# Patient Record
Sex: FEMALE | Race: WHITE | HISPANIC OR LATINO | Employment: UNEMPLOYED | URBAN - METROPOLITAN AREA
[De-identification: names, ages, dates, MRNs, and addresses within clinical notes are randomized per-mention and may not be internally consistent; named-entity substitution may affect disease eponyms.]

---

## 2017-02-14 ENCOUNTER — NON-PROVIDER VISIT (OUTPATIENT)
Dept: OBGYN | Facility: CLINIC | Age: 22
End: 2017-02-14
Payer: COMMERCIAL

## 2017-02-14 DIAGNOSIS — Z32.01 PREGNANCY EXAMINATION OR TEST, POSITIVE RESULT: ICD-10-CM

## 2017-02-14 LAB
INT CON NEG: NEGATIVE
INT CON POS: POSITIVE
POC URINE PREGNANCY TEST: POSITIVE

## 2017-02-14 PROCEDURE — 81025 URINE PREGNANCY TEST: CPT | Performed by: OBSTETRICS & GYNECOLOGY

## 2017-07-22 ENCOUNTER — HOSPITAL ENCOUNTER (OUTPATIENT)
Dept: LAB | Facility: MEDICAL CENTER | Age: 22
End: 2017-07-22
Attending: SPECIALIST
Payer: MEDICAID

## 2017-07-22 LAB
ABO GROUP BLD: NORMAL
BASOPHILS # BLD AUTO: 0.4 % (ref 0–1.8)
BASOPHILS # BLD: 0.05 K/UL (ref 0–0.12)
BLD GP AB SCN SERPL QL: NORMAL
EOSINOPHIL # BLD AUTO: 0.15 K/UL (ref 0–0.51)
EOSINOPHIL NFR BLD: 1.1 % (ref 0–6.9)
ERYTHROCYTE [DISTWIDTH] IN BLOOD BY AUTOMATED COUNT: 45.1 FL (ref 35.9–50)
HBV SURFACE AG SER QL: NEGATIVE
HCT VFR BLD AUTO: 35.7 % (ref 37–47)
HGB BLD-MCNC: 12.1 G/DL (ref 12–16)
IMM GRANULOCYTES # BLD AUTO: 0.05 K/UL (ref 0–0.11)
IMM GRANULOCYTES NFR BLD AUTO: 0.4 % (ref 0–0.9)
LYMPHOCYTES # BLD AUTO: 3.86 K/UL (ref 1–4.8)
LYMPHOCYTES NFR BLD: 28 % (ref 22–41)
MCH RBC QN AUTO: 30.5 PG (ref 27–33)
MCHC RBC AUTO-ENTMCNC: 33.9 G/DL (ref 33.6–35)
MCV RBC AUTO: 89.9 FL (ref 81.4–97.8)
MONOCYTES # BLD AUTO: 0.71 K/UL (ref 0–0.85)
MONOCYTES NFR BLD AUTO: 5.2 % (ref 0–13.4)
NEUTROPHILS # BLD AUTO: 8.95 K/UL (ref 2–7.15)
NEUTROPHILS NFR BLD: 64.9 % (ref 44–72)
NRBC # BLD AUTO: 0 K/UL
NRBC BLD AUTO-RTO: 0 /100 WBC
PLATELET # BLD AUTO: 284 K/UL (ref 164–446)
PMV BLD AUTO: 10.6 FL (ref 9–12.9)
RBC # BLD AUTO: 3.97 M/UL (ref 4.2–5.4)
RH BLD: NORMAL
RUBV AB SER QL: 132.5 IU/ML
TREPONEMA PALLIDUM IGG+IGM AB [PRESENCE] IN SERUM OR PLASMA BY IMMUNOASSAY: NON REACTIVE
WBC # BLD AUTO: 13.8 K/UL (ref 4.8–10.8)

## 2017-07-22 PROCEDURE — 85025 COMPLETE CBC W/AUTO DIFF WBC: CPT

## 2017-07-22 PROCEDURE — 86900 BLOOD TYPING SEROLOGIC ABO: CPT

## 2017-07-22 PROCEDURE — 86850 RBC ANTIBODY SCREEN: CPT

## 2017-07-22 PROCEDURE — 81511 FTL CGEN ABNOR FOUR ANAL: CPT

## 2017-07-22 PROCEDURE — 87340 HEPATITIS B SURFACE AG IA: CPT

## 2017-07-22 PROCEDURE — 36415 COLL VENOUS BLD VENIPUNCTURE: CPT

## 2017-07-22 PROCEDURE — 86780 TREPONEMA PALLIDUM: CPT

## 2017-07-22 PROCEDURE — 86762 RUBELLA ANTIBODY: CPT

## 2017-07-22 PROCEDURE — 86901 BLOOD TYPING SEROLOGIC RH(D): CPT

## 2017-07-25 LAB
# FETUSES US: NORMAL
AFP MOM SERPL: 0.81
AFP SERPL-MCNC: 21 NG/ML
AGE - REPORTED: 24.7 YR
GA METHOD: NORMAL
GA: 16.57 WEEKS
HCG MOM SERPL: 0.52
HCG SERPL-ACNC: NORMAL IU/L
IDDM PATIENT QL: NO
INHIBIN A MOM SERPL: 1.13
INHIBIN A SERPL-MCNC: 151 PG/ML
INTEGRATED SCN PATIENT-IMP: NORMAL
PATHOLOGY STUDY: NORMAL
U ESTRIOL MOM SERPL: 0.85
U ESTRIOL SERPL-MCNC: 0.72 NG/ML

## 2017-10-19 ENCOUNTER — APPOINTMENT (OUTPATIENT)
Dept: RADIOLOGY | Facility: MEDICAL CENTER | Age: 22
DRG: 781 | End: 2017-10-19
Attending: UROLOGY
Payer: MEDICAID

## 2017-10-19 ENCOUNTER — APPOINTMENT (OUTPATIENT)
Dept: RADIOLOGY | Facility: MEDICAL CENTER | Age: 22
DRG: 781 | End: 2017-10-19
Attending: NURSE PRACTITIONER
Payer: MEDICAID

## 2017-10-19 ENCOUNTER — HOSPITAL ENCOUNTER (INPATIENT)
Facility: MEDICAL CENTER | Age: 22
LOS: 1 days | DRG: 781 | End: 2017-10-20
Attending: SPECIALIST | Admitting: SPECIALIST
Payer: MEDICAID

## 2017-10-19 ENCOUNTER — APPOINTMENT (OUTPATIENT)
Dept: RADIOLOGY | Facility: MEDICAL CENTER | Age: 22
DRG: 781 | End: 2017-10-19
Attending: SPECIALIST
Payer: MEDICAID

## 2017-10-19 LAB
ALBUMIN SERPL BCP-MCNC: 3.7 G/DL (ref 3.2–4.9)
ALBUMIN/GLOB SERPL: 1.2 G/DL
ALP SERPL-CCNC: 94 U/L (ref 30–99)
ALT SERPL-CCNC: 9 U/L (ref 2–50)
AMYLASE SERPL-CCNC: 26 U/L (ref 20–103)
ANION GAP SERPL CALC-SCNC: 13 MMOL/L (ref 0–11.9)
APPEARANCE UR: ABNORMAL
APPEARANCE UR: CLEAR
AST SERPL-CCNC: 11 U/L (ref 12–45)
BASOPHILS # BLD AUTO: 0.2 % (ref 0–1.8)
BASOPHILS # BLD: 0.04 K/UL (ref 0–0.12)
BILIRUB SERPL-MCNC: 0.4 MG/DL (ref 0.1–1.5)
BUN SERPL-MCNC: 11 MG/DL (ref 8–22)
CALCIUM SERPL-MCNC: 9.5 MG/DL (ref 8.5–10.5)
CHLORIDE SERPL-SCNC: 103 MMOL/L (ref 96–112)
CO2 SERPL-SCNC: 22 MMOL/L (ref 20–33)
COLOR UR AUTO: YELLOW
COLOR UR AUTO: YELLOW
CREAT SERPL-MCNC: 0.64 MG/DL (ref 0.5–1.4)
EOSINOPHIL # BLD AUTO: 0.07 K/UL (ref 0–0.51)
EOSINOPHIL NFR BLD: 0.4 % (ref 0–6.9)
ERYTHROCYTE [DISTWIDTH] IN BLOOD BY AUTOMATED COUNT: 45.1 FL (ref 35.9–50)
GFR SERPL CREATININE-BSD FRML MDRD: >60 ML/MIN/1.73 M 2
GLOBULIN SER CALC-MCNC: 3.1 G/DL (ref 1.9–3.5)
GLUCOSE SERPL-MCNC: 86 MG/DL (ref 65–99)
GLUCOSE UR QL STRIP.AUTO: NEGATIVE MG/DL
GLUCOSE UR QL STRIP.AUTO: NEGATIVE MG/DL
HCT VFR BLD AUTO: 34.7 % (ref 37–47)
HGB BLD-MCNC: 11.6 G/DL (ref 12–16)
IMM GRANULOCYTES # BLD AUTO: 0.14 K/UL (ref 0–0.11)
IMM GRANULOCYTES NFR BLD AUTO: 0.8 % (ref 0–0.9)
INR PPP: 1.07 (ref 0.87–1.13)
KETONES UR QL STRIP.AUTO: 80 MG/DL
KETONES UR QL STRIP.AUTO: NEGATIVE MG/DL
LEUKOCYTE ESTERASE UR QL STRIP.AUTO: ABNORMAL
LEUKOCYTE ESTERASE UR QL STRIP.AUTO: NEGATIVE
LIPASE SERPL-CCNC: 15 U/L (ref 11–82)
LYMPHOCYTES # BLD AUTO: 2.47 K/UL (ref 1–4.8)
LYMPHOCYTES NFR BLD: 14 % (ref 22–41)
MCH RBC QN AUTO: 31.9 PG (ref 27–33)
MCHC RBC AUTO-ENTMCNC: 33.4 G/DL (ref 33.6–35)
MCV RBC AUTO: 95.3 FL (ref 81.4–97.8)
MONOCYTES # BLD AUTO: 0.85 K/UL (ref 0–0.85)
MONOCYTES NFR BLD AUTO: 4.8 % (ref 0–13.4)
NEUTROPHILS # BLD AUTO: 14.1 K/UL (ref 2–7.15)
NEUTROPHILS NFR BLD: 79.8 % (ref 44–72)
NITRITE UR QL STRIP.AUTO: NEGATIVE
NITRITE UR QL STRIP.AUTO: NEGATIVE
NRBC # BLD AUTO: 0 K/UL
NRBC BLD AUTO-RTO: 0 /100 WBC
PH UR STRIP.AUTO: 7 [PH]
PH UR STRIP.AUTO: 7 [PH]
PLATELET # BLD AUTO: 309 K/UL (ref 164–446)
PMV BLD AUTO: 10.2 FL (ref 9–12.9)
POTASSIUM SERPL-SCNC: 3.2 MMOL/L (ref 3.6–5.5)
PROT SERPL-MCNC: 6.8 G/DL (ref 6–8.2)
PROT UR QL STRIP: 30 MG/DL
PROT UR QL STRIP: ABNORMAL MG/DL
PROTHROMBIN TIME: 14.2 SEC (ref 12–14.6)
RBC # BLD AUTO: 3.64 M/UL (ref 4.2–5.4)
RBC UR QL AUTO: ABNORMAL
RBC UR QL AUTO: ABNORMAL
SODIUM SERPL-SCNC: 138 MMOL/L (ref 135–145)
SP GR UR: 1.01
SP GR UR: 1.02
WBC # BLD AUTO: 17.7 K/UL (ref 4.8–10.8)

## 2017-10-19 PROCEDURE — 85025 COMPLETE CBC W/AUTO DIFF WBC: CPT

## 2017-10-19 PROCEDURE — 160028 HCHG SURGERY MINUTES - 1ST 30 MINS LEVEL 3: Performed by: UROLOGY

## 2017-10-19 PROCEDURE — 36415 COLL VENOUS BLD VENIPUNCTURE: CPT

## 2017-10-19 PROCEDURE — 700111 HCHG RX REV CODE 636 W/ 250 OVERRIDE (IP)

## 2017-10-19 PROCEDURE — 160023 HCHG SPINAL: Performed by: UROLOGY

## 2017-10-19 PROCEDURE — 99153 MOD SED SAME PHYS/QHP EA: CPT

## 2017-10-19 PROCEDURE — 0T778DZ DILATION OF LEFT URETER WITH INTRALUMINAL DEVICE, VIA NATURAL OR ARTIFICIAL OPENING ENDOSCOPIC: ICD-10-PCS | Performed by: UROLOGY

## 2017-10-19 PROCEDURE — A4357 BEDSIDE DRAINAGE BAG: HCPCS | Performed by: UROLOGY

## 2017-10-19 PROCEDURE — 160035 HCHG PACU - 1ST 60 MINS PHASE I: Performed by: UROLOGY

## 2017-10-19 PROCEDURE — 160036 HCHG PACU - EA ADDL 30 MINS PHASE I: Performed by: UROLOGY

## 2017-10-19 PROCEDURE — 302790 HCHG STAT ANTEPARTUM CARE, DAILY

## 2017-10-19 PROCEDURE — 770002 HCHG ROOM/CARE - OB PRIVATE (112)

## 2017-10-19 PROCEDURE — 501329 HCHG SET, CYSTO IRRIG Y TUR: Performed by: UROLOGY

## 2017-10-19 PROCEDURE — 80053 COMPREHEN METABOLIC PANEL: CPT

## 2017-10-19 PROCEDURE — 700111 HCHG RX REV CODE 636 W/ 250 OVERRIDE (IP): Performed by: RADIOLOGY

## 2017-10-19 PROCEDURE — 81002 URINALYSIS NONAUTO W/O SCOPE: CPT | Mod: 91

## 2017-10-19 PROCEDURE — 160002 HCHG RECOVERY MINUTES (STAT): Performed by: UROLOGY

## 2017-10-19 PROCEDURE — 82150 ASSAY OF AMYLASE: CPT

## 2017-10-19 PROCEDURE — 500880 HCHG PACK, CYSTO W/SEP LEGGINGS: Performed by: UROLOGY

## 2017-10-19 PROCEDURE — 0TJ53ZZ INSPECTION OF KIDNEY, PERCUTANEOUS APPROACH: ICD-10-PCS | Performed by: RADIOLOGY

## 2017-10-19 PROCEDURE — 99152 MOD SED SAME PHYS/QHP 5/>YRS: CPT | Performed by: UROLOGY

## 2017-10-19 PROCEDURE — 700101 HCHG RX REV CODE 250

## 2017-10-19 PROCEDURE — 700111 HCHG RX REV CODE 636 W/ 250 OVERRIDE (IP): Performed by: SPECIALIST

## 2017-10-19 PROCEDURE — 160039 HCHG SURGERY MINUTES - EA ADDL 1 MIN LEVEL 3: Performed by: UROLOGY

## 2017-10-19 PROCEDURE — C1769 GUIDE WIRE: HCPCS | Performed by: UROLOGY

## 2017-10-19 PROCEDURE — 160048 HCHG OR STATISTICAL LEVEL 1-5: Performed by: UROLOGY

## 2017-10-19 PROCEDURE — 76775 US EXAM ABDO BACK WALL LIM: CPT

## 2017-10-19 PROCEDURE — 85610 PROTHROMBIN TIME: CPT

## 2017-10-19 PROCEDURE — 700105 HCHG RX REV CODE 258: Performed by: SPECIALIST

## 2017-10-19 PROCEDURE — 99153 MOD SED SAME PHYS/QHP EA: CPT | Performed by: UROLOGY

## 2017-10-19 PROCEDURE — C2617 STENT, NON-COR, TEM W/O DEL: HCPCS | Performed by: UROLOGY

## 2017-10-19 PROCEDURE — 83690 ASSAY OF LIPASE: CPT

## 2017-10-19 DEVICE — STENT PERCUFLEX PLUS 4.8X24: Type: IMPLANTABLE DEVICE | Status: FUNCTIONAL

## 2017-10-19 RX ORDER — SODIUM CHLORIDE, SODIUM LACTATE, POTASSIUM CHLORIDE, CALCIUM CHLORIDE 600; 310; 30; 20 MG/100ML; MG/100ML; MG/100ML; MG/100ML
INJECTION, SOLUTION INTRAVENOUS
Status: ACTIVE
Start: 2017-10-19 | End: 2017-10-19

## 2017-10-19 RX ORDER — CEFAZOLIN SODIUM 1 G/3ML
INJECTION, POWDER, FOR SOLUTION INTRAMUSCULAR; INTRAVENOUS
Status: COMPLETED
Start: 2017-10-19 | End: 2017-10-19

## 2017-10-19 RX ORDER — MORPHINE SULFATE 10 MG/ML
10 INJECTION, SOLUTION INTRAMUSCULAR; INTRAVENOUS EVERY 4 HOURS PRN
Status: DISCONTINUED | OUTPATIENT
Start: 2017-10-19 | End: 2017-10-20 | Stop reason: HOSPADM

## 2017-10-19 RX ORDER — SODIUM CHLORIDE, SODIUM LACTATE, POTASSIUM CHLORIDE, CALCIUM CHLORIDE 600; 310; 30; 20 MG/100ML; MG/100ML; MG/100ML; MG/100ML
INJECTION, SOLUTION INTRAVENOUS CONTINUOUS
Status: DISCONTINUED | OUTPATIENT
Start: 2017-10-19 | End: 2017-10-20 | Stop reason: HOSPADM

## 2017-10-19 RX ORDER — CEFAZOLIN SODIUM 2 G/100ML
2 INJECTION, SOLUTION INTRAVENOUS ONCE
Status: COMPLETED | OUTPATIENT
Start: 2017-10-19 | End: 2017-10-19

## 2017-10-19 RX ORDER — MIDAZOLAM HYDROCHLORIDE 1 MG/ML
INJECTION INTRAMUSCULAR; INTRAVENOUS
Status: COMPLETED
Start: 2017-10-19 | End: 2017-10-19

## 2017-10-19 RX ORDER — MORPHINE SULFATE 10 MG/ML
10 INJECTION, SOLUTION INTRAMUSCULAR; INTRAVENOUS
Status: DISCONTINUED | OUTPATIENT
Start: 2017-10-19 | End: 2017-10-19

## 2017-10-19 RX ORDER — ONDANSETRON 2 MG/ML
4 INJECTION INTRAMUSCULAR; INTRAVENOUS EVERY 4 HOURS PRN
Status: DISCONTINUED | OUTPATIENT
Start: 2017-10-19 | End: 2017-10-20 | Stop reason: HOSPADM

## 2017-10-19 RX ORDER — ONDANSETRON 2 MG/ML
INJECTION INTRAMUSCULAR; INTRAVENOUS
Status: COMPLETED
Start: 2017-10-19 | End: 2017-10-19

## 2017-10-19 RX ORDER — MAGNESIUM HYDROXIDE 1200 MG/15ML
LIQUID ORAL
Status: DISCONTINUED | OUTPATIENT
Start: 2017-10-19 | End: 2017-10-19 | Stop reason: HOSPADM

## 2017-10-19 RX ORDER — MIDAZOLAM HYDROCHLORIDE 1 MG/ML
.5-2 INJECTION INTRAMUSCULAR; INTRAVENOUS PRN
Status: ACTIVE | OUTPATIENT
Start: 2017-10-19 | End: 2017-10-19

## 2017-10-19 RX ORDER — CEPHALEXIN 250 MG/1
250 CAPSULE ORAL EVERY 6 HOURS
Status: DISCONTINUED | OUTPATIENT
Start: 2017-10-20 | End: 2017-10-20 | Stop reason: HOSPADM

## 2017-10-19 RX ORDER — ONDANSETRON 2 MG/ML
4 INJECTION INTRAMUSCULAR; INTRAVENOUS PRN
Status: DISPENSED | OUTPATIENT
Start: 2017-10-19 | End: 2017-10-19

## 2017-10-19 RX ORDER — ONDANSETRON 2 MG/ML
INJECTION INTRAMUSCULAR; INTRAVENOUS
Status: ACTIVE
Start: 2017-10-19 | End: 2017-10-19

## 2017-10-19 RX ORDER — SODIUM CHLORIDE 9 MG/ML
500 INJECTION, SOLUTION INTRAVENOUS
Status: ACTIVE | OUTPATIENT
Start: 2017-10-19 | End: 2017-10-19

## 2017-10-19 RX ORDER — LIDOCAINE HYDROCHLORIDE 10 MG/ML
INJECTION, SOLUTION INFILTRATION; PERINEURAL
Status: COMPLETED
Start: 2017-10-19 | End: 2017-10-19

## 2017-10-19 RX ADMIN — MORPHINE SULFATE 10 MG: 10 INJECTION INTRAVENOUS at 09:20

## 2017-10-19 RX ADMIN — MIDAZOLAM 1 MG: 1 INJECTION INTRAMUSCULAR; INTRAVENOUS at 16:34

## 2017-10-19 RX ADMIN — ONDANSETRON HYDROCHLORIDE 4 MG: 2 INJECTION, SOLUTION INTRAMUSCULAR; INTRAVENOUS at 06:22

## 2017-10-19 RX ADMIN — ONDANSETRON HYDROCHLORIDE 4 MG: 2 INJECTION, SOLUTION INTRAMUSCULAR; INTRAVENOUS at 11:50

## 2017-10-19 RX ADMIN — FENTANYL CITRATE 100 MCG: 50 INJECTION, SOLUTION INTRAMUSCULAR; INTRAVENOUS at 04:55

## 2017-10-19 RX ADMIN — MIDAZOLAM HYDROCHLORIDE 1 MG: 1 INJECTION INTRAMUSCULAR; INTRAVENOUS at 16:34

## 2017-10-19 RX ADMIN — CEFAZOLIN 2000 MG: 1 INJECTION, POWDER, FOR SOLUTION INTRAVENOUS at 16:31

## 2017-10-19 RX ADMIN — FENTANYL CITRATE 25 MCG: 50 INJECTION, SOLUTION INTRAMUSCULAR; INTRAVENOUS at 16:51

## 2017-10-19 RX ADMIN — MORPHINE SULFATE 10 MG: 10 INJECTION INTRAVENOUS at 11:48

## 2017-10-19 RX ADMIN — MORPHINE SULFATE 10 MG: 10 INJECTION INTRAVENOUS at 07:13

## 2017-10-19 RX ADMIN — LIDOCAINE HYDROCHLORIDE: 10 INJECTION, SOLUTION INFILTRATION; PERINEURAL at 17:00

## 2017-10-19 RX ADMIN — SODIUM CHLORIDE, POTASSIUM CHLORIDE, SODIUM LACTATE AND CALCIUM CHLORIDE: 600; 310; 30; 20 INJECTION, SOLUTION INTRAVENOUS at 12:41

## 2017-10-19 RX ADMIN — FENTANYL CITRATE 100 MCG: 50 INJECTION, SOLUTION INTRAMUSCULAR; INTRAVENOUS at 06:22

## 2017-10-19 RX ADMIN — SODIUM CHLORIDE, POTASSIUM CHLORIDE, SODIUM LACTATE AND CALCIUM CHLORIDE: 600; 310; 30; 20 INJECTION, SOLUTION INTRAVENOUS at 06:22

## 2017-10-19 RX ADMIN — ONDANSETRON HYDROCHLORIDE 4 MG: 2 INJECTION, SOLUTION INTRAMUSCULAR; INTRAVENOUS at 17:28

## 2017-10-19 RX ADMIN — FENTANYL CITRATE 25 MCG: 50 INJECTION, SOLUTION INTRAMUSCULAR; INTRAVENOUS at 16:34

## 2017-10-19 RX ADMIN — MORPHINE SULFATE 10 MG: 10 INJECTION INTRAVENOUS at 15:01

## 2017-10-19 RX ADMIN — CEPHALEXIN 250 MG: 250 CAPSULE ORAL at 23:50

## 2017-10-19 RX ADMIN — SODIUM CHLORIDE, POTASSIUM CHLORIDE, SODIUM LACTATE AND CALCIUM CHLORIDE: 600; 310; 30; 20 INJECTION, SOLUTION INTRAVENOUS at 10:11

## 2017-10-19 RX ADMIN — ONDANSETRON 4 MG: 2 INJECTION INTRAMUSCULAR; INTRAVENOUS at 20:13

## 2017-10-19 ASSESSMENT — PAIN SCALES - GENERAL
PAINLEVEL_OUTOF10: 0
PAINLEVEL_OUTOF10: 7
PAINLEVEL_OUTOF10: 0
PAINLEVEL_OUTOF10: 2
PAINLEVEL_OUTOF10: 0
PAINLEVEL_OUTOF10: 8

## 2017-10-19 NOTE — PROGRESS NOTES
0700. Report from Graciela HERNANDEZ. POC discussed and resumed.    0710. Report to Dr. Burns, orders received.    0713. Morphine given.    0830. Pt called out and is having pain again. Notified that she is not due for pain meds until 0913. Heat pack offered and given.     1143. Message left for Dr. Burns.    1218. Report to Dr. Burns, no new orders received. He is to consult urology. Pt updated on the POC.    1555. Pt taken for neph tube.

## 2017-10-19 NOTE — OR SURGEON
Immediate Post- Operative Note        PostOp Diagnosis: Left hydronephrosis and fever      Procedure(s):  Attempted Left Neph tube.Minimal hydro and large patient.  Unable to place neph tube.  Rec ureteral stent placement.  Case discussed with Marva PENN.    Estimated Blood Loss: Less than 5 ml        Complications: None            10/19/2017     4:25 PM     Miki Nielsen

## 2017-10-19 NOTE — PROGRESS NOTES
"Jayson is a very pleasant 22-year-old nullipara who is currently 29 weeks and 2 days gestation with prenatal care with myself during the course of this pregnancy. She tells me that she began having sudden severe left sided abdominal and flank pain at about 2:00 this morning and the symptoms were accompanied by nausea and vomiting and she presented to labor and delivery. Her urinalysis revealed large blood and trace leukocyte esterase and 30 mg/dL of protein. Her white blood count was 17.7 and hemoglobin and hematocrit were normal. Her vital signs are stable and she is afebrile. She appears slightly uncomfortable. On exam there is some costovertebral angle tenderness and left flank tenderness. Ultrasound reveals according to the report \"minimal left hydronephrosis\", and \"no renal stone or left ureter identified\", it \"normal appearance of the right kidney\", and \"empty bladder\".  According to the nurse's exam the cervix was long and closed.  Assessment:  1.) Intrauterine pregnancy at 29 weeks and 1 day gestation.  2.) Likely left urolithiasis  Plan:  We will transfer the patient to antepartum unit and give her IV fluid and continued to give her IV analgesia (we have been given her fentanyl) and will consider urology consultation.  Carlos Eduardo Burns M.D.  "

## 2017-10-19 NOTE — PROGRESS NOTES
IR Procedure Note:    Dr. Nielsen attempted left nephrostomy tube placement.  The patient tolerated the procedure well, vital signs stable; ETCo2 baseline 28, with consistent waveform during the procedure.  Procedure aborted.  Report given to DAVID Gauthier.  Patient transported to room with transport in stable condition.

## 2017-10-19 NOTE — CONSULTS
DATE OF SERVICE:  10/19/2017    REQUESTING PHYSICIAN:  Carlos Eduardo Burns MD    CONSULTING UROLOGIST:  Stef Larsen MD/DALE Barlow    REASON FOR CONSULT:  Left flank pain with hydronephrosis.    HISTORY OF PRESENT ILLNESS:  This 22-year-old female presented to the hospital   after having a sudden onset of severe left-sided abdominal and flank pain   early this morning.  She is also complaining of some nausea and vomiting.    Urinalysis was done that showed a large amount of blood and trace leukocytes.    Patient is 29 weeks and 2 days gestation.  She does not have a history of any   renal stones.  She denies any fever or chills.  No shortness of breath.    REVIEW OF SYSTEMS:  All pertinent positives addressed under HPI.    MEDICATIONS:  Please see patient's record.    PHYSICAL EXAMINATION:  VITAL SIGNS:  She has a temperature of 97.9, pulse 78, respirations 18, blood   pressure 131/61.  GENERAL:  She is awake, alert and oriented and in no acute distress.  HEART:  Regular rate and rhythm.  LUNGS:  Clear.  Some left CVA tenderness.    LABORATORY DATA:  WBC is 17.7, hemoglobin 11.6, hematocrit 34.7.  Sodium 138,   potassium 3.2, BUN 11, creatinine 0.64.    IMAGING:  Renal ultrasound showed mild left hydronephrosis and they are not   identifying any renal or ureteral stone.    ASSESSMENT AND PLAN:  After discussing with Dr. Larsen, I met with the   patient and her significant other and discussed the option of either an   ureteral stent placement or a left nephrostomy tube to be placed.  We   thoroughly discussed the pros and cons of both procedures and the patient has   decided to move forward with a left nephrostomy tube placement.  The order has   been placed with interventional radiology.  We will need her to follow up   with us once she delivers, so that we can further assess her hydronephrosis   and the left kidney for any possible stones that need to be treated.    Thank you for this consult.   Urology will follow.       ____________________________________     FILI CHAN / KIRSTEN    DD:  10/19/2017 13:30:51  DT:  10/19/2017 13:59:45    D#:  [id number]  Job#:  [id number]    cc: HANNY JACOBS MD

## 2017-10-19 NOTE — PROGRESS NOTES
0400- 21 y/o, , EDC 18, EGA 29.2. Here to room 212 with FOB. C/o severe left sided flank pain that shoots across her belly and n/v x3. EFM/toco applied, patient denies lof/bleeding, reports positive fm. VSS.     0420- Updates to Dr. Burns. Orders for labs and to check cervix.     0430- SVE- fingertip. Large amount of blood in urine and trace leukocytes. Patient denies any signs/symptoms of a UTI. Orders for IV and renal ultrasound.    0448- US at bedside. Patient vomiting in bathroom.     0600- Dr. Burns to bedside. US shows no kidney stones. Plan to manage pain and nausea and IV hydrate until urology consult can be placed.     0700- Report to MARYSE Romero RN. POC discussed.

## 2017-10-20 VITALS
WEIGHT: 255 LBS | RESPIRATION RATE: 18 BRPM | HEIGHT: 62 IN | SYSTOLIC BLOOD PRESSURE: 117 MMHG | HEART RATE: 83 BPM | BODY MASS INDEX: 46.93 KG/M2 | TEMPERATURE: 97.6 F | OXYGEN SATURATION: 97 % | DIASTOLIC BLOOD PRESSURE: 52 MMHG

## 2017-10-20 PROBLEM — N20.9 UROLITHIASIS: Status: ACTIVE | Noted: 2017-10-20

## 2017-10-20 LAB
ANION GAP SERPL CALC-SCNC: 9 MMOL/L (ref 0–11.9)
BASOPHILS # BLD AUTO: 0.1 % (ref 0–1.8)
BASOPHILS # BLD: 0.02 K/UL (ref 0–0.12)
BUN SERPL-MCNC: 8 MG/DL (ref 8–22)
CALCIUM SERPL-MCNC: 8.6 MG/DL (ref 8.5–10.5)
CHLORIDE SERPL-SCNC: 105 MMOL/L (ref 96–112)
CO2 SERPL-SCNC: 22 MMOL/L (ref 20–33)
CREAT SERPL-MCNC: 0.45 MG/DL (ref 0.5–1.4)
EOSINOPHIL # BLD AUTO: 0.02 K/UL (ref 0–0.51)
EOSINOPHIL NFR BLD: 0.1 % (ref 0–6.9)
ERYTHROCYTE [DISTWIDTH] IN BLOOD BY AUTOMATED COUNT: 45.7 FL (ref 35.9–50)
GFR SERPL CREATININE-BSD FRML MDRD: >60 ML/MIN/1.73 M 2
GLUCOSE SERPL-MCNC: 101 MG/DL (ref 65–99)
HCT VFR BLD AUTO: 29.1 % (ref 37–47)
HGB BLD-MCNC: 10 G/DL (ref 12–16)
IMM GRANULOCYTES # BLD AUTO: 0.07 K/UL (ref 0–0.11)
IMM GRANULOCYTES NFR BLD AUTO: 0.5 % (ref 0–0.9)
LYMPHOCYTES # BLD AUTO: 2.76 K/UL (ref 1–4.8)
LYMPHOCYTES NFR BLD: 20.5 % (ref 22–41)
MCH RBC QN AUTO: 32.7 PG (ref 27–33)
MCHC RBC AUTO-ENTMCNC: 34.4 G/DL (ref 33.6–35)
MCV RBC AUTO: 95.1 FL (ref 81.4–97.8)
MONOCYTES # BLD AUTO: 0.7 K/UL (ref 0–0.85)
MONOCYTES NFR BLD AUTO: 5.2 % (ref 0–13.4)
NEUTROPHILS # BLD AUTO: 9.91 K/UL (ref 2–7.15)
NEUTROPHILS NFR BLD: 73.6 % (ref 44–72)
NRBC # BLD AUTO: 0 K/UL
NRBC BLD AUTO-RTO: 0 /100 WBC
PLATELET # BLD AUTO: 243 K/UL (ref 164–446)
PMV BLD AUTO: 10.2 FL (ref 9–12.9)
POTASSIUM SERPL-SCNC: 3.4 MMOL/L (ref 3.6–5.5)
RBC # BLD AUTO: 3.06 M/UL (ref 4.2–5.4)
SODIUM SERPL-SCNC: 136 MMOL/L (ref 135–145)
WBC # BLD AUTO: 13.5 K/UL (ref 4.8–10.8)

## 2017-10-20 PROCEDURE — 80048 BASIC METABOLIC PNL TOTAL CA: CPT

## 2017-10-20 PROCEDURE — A9270 NON-COVERED ITEM OR SERVICE: HCPCS | Performed by: UROLOGY

## 2017-10-20 PROCEDURE — 700102 HCHG RX REV CODE 250 W/ 637 OVERRIDE(OP): Performed by: UROLOGY

## 2017-10-20 PROCEDURE — 85025 COMPLETE CBC W/AUTO DIFF WBC: CPT

## 2017-10-20 PROCEDURE — 59025 FETAL NON-STRESS TEST: CPT | Performed by: SPECIALIST

## 2017-10-20 PROCEDURE — 36415 COLL VENOUS BLD VENIPUNCTURE: CPT

## 2017-10-20 RX ADMIN — CEPHALEXIN 250 MG: 250 CAPSULE ORAL at 13:18

## 2017-10-20 RX ADMIN — CEPHALEXIN 250 MG: 250 CAPSULE ORAL at 06:00

## 2017-10-20 ASSESSMENT — PAIN SCALES - GENERAL
PAINLEVEL_OUTOF10: 1
PAINLEVEL_OUTOF10: 0
PAINLEVEL_OUTOF10: 0
PAINLEVEL_OUTOF10: 1

## 2017-10-20 ASSESSMENT — PATIENT HEALTH QUESTIONNAIRE - PHQ9
1. LITTLE INTEREST OR PLEASURE IN DOING THINGS: NOT AT ALL
1. LITTLE INTEREST OR PLEASURE IN DOING THINGS: NOT AT ALL
SUM OF ALL RESPONSES TO PHQ9 QUESTIONS 1 AND 2: 0
2. FEELING DOWN, DEPRESSED, IRRITABLE, OR HOPELESS: NOT AT ALL
2. FEELING DOWN, DEPRESSED, IRRITABLE, OR HOPELESS: NOT AT ALL
SUM OF ALL RESPONSES TO PHQ9 QUESTIONS 1 AND 2: 0
SUM OF ALL RESPONSES TO PHQ QUESTIONS 1-9: 0
SUM OF ALL RESPONSES TO PHQ QUESTIONS 1-9: 0

## 2017-10-20 NOTE — CARE PLAN
Problem: Infection  Goal: Will remain free from infection  Outcome: PROGRESSING AS EXPECTED  Patient is afebrile. No S&S of infections     Problem: Pain Management  Goal: Pain level will decrease to patient's comfort goal  Outcome: PROGRESSING AS EXPECTED  Patient states she has mild pain from stent placement, but states pain is a lot better. Denies any need for pain intervention at this time. Instructed to notify RN if any is needed

## 2017-10-20 NOTE — PROGRESS NOTES
The patient is today 29 weeks and 2 days gestation. She is very pleasant 22-year-old nullipara who has had prenatal care with myself and was admitted yesterday because of symptoms (severe left flank pain and nausea and vomiting) ostensibly due to left urolithiasis (suggested by symptoms and also hematuria). Urology was consulted and efforts by interventional radiology to place a left-sided nephrostomy tube were unsuccessful and so Dr. Covington placed a left ureteral stent. Prior to placement of left ureteral stent the patient had been having very severe pain and was requesting intravenous morphine every hour. The patient tells me today that her pain is much less and that in fact she is having very little to no pain. She says she continues to feel fetal movement. She denies any fever.  The patient's vital signs are stable and she is afebrile.  The patient appears well-developed and well-nourished and relaxed and alert and comfortable and in no apparent distress.  The fetal heart tracing has been reassuring.  We will order labs for today. Many thanks to urology.  Carlos Eduardo Burns M.D.

## 2017-10-20 NOTE — PROGRESS NOTES
"2100: Pt brought back to room via hospital bed by transport. Pt vital signs stable at this time, X BP 91/57 pt states, \"Not feeling dizzy.\" LR bolus running at this time.   2115: Ice chips provided to patient.   2125: Pt tolerating ice chips, clear liquids provided.   2128: Pt hooked up to TOCO/US at this time.   2339: Pt states, \"I am having pressure in my lower abdomen.\" No uterine contractions palpated at this time, TOCO in place.  Pt states the pain is constant and rates it on a 2 on a pain scale 0 to 10. Pt also states she is having left flank pain, rating it at a 5. Pt placed on a wedge on the left side, and states the pain is much better with the wedge in place.   0000: Pt states left sided pain (flank pain) is much better, rating it at a 1 on a pain scale 0 to 10.   0016: Pt sitting at bedside eating at this time.   0100: Pt called out with concerns about spotting on toilet paper when up to the bathroom. Pt educated about the possible spotting being from the stent placement. Pt encouraged to call out if an additional bleeding occurs. All questions and concerns addressed. Pt declines any pain at this time.   0635: Dr. Burns updated with patient status, no new orders at this time.   0700: Report given to JYOTHI Johansen.   "

## 2017-10-20 NOTE — PROGRESS NOTES
"Urology Progress Note    Post op Day # 1. Right ureteral stent placement    Overnight Events: None    S: No fevers, chills, nausea or vomiting.  Feeling much better.    O:   Blood pressure 117/52, pulse 83, temperature 36.4 °C (97.6 °F), resp. rate 18, height 1.575 m (5' 2\"), weight 115.7 kg (255 lb), SpO2 97 %, not currently breastfeeding.  Recent Labs      10/19/17   0421  10/20/17   0826   SODIUM  138  136   POTASSIUM  3.2*  3.4*   CHLORIDE  103  105   CO2  22  22   GLUCOSE  86  101*   BUN  11  8   CREATININE  0.64  0.45*   CALCIUM  9.5  8.6     Recent Labs      10/19/17   0421  10/20/17   0826   WBC  17.7*  13.5*   RBC  3.64*  3.06*   HEMOGLOBIN  11.6*  10.0*   HEMATOCRIT  34.7*  29.1*   MCV  95.3  95.1   MCH  31.9  32.7   MCHC  33.4*  34.4   RDW  45.1  45.7   PLATELETCT  309  243   MPV  10.2  10.2         Intake/Output Summary (Last 24 hours) at 10/20/17 1421  Last data filed at 10/19/17 2000   Gross per 24 hour   Intake              700 ml   Output                0 ml   Net              700 ml       Exam:  Abdomen soft, benign.     Urine: clear      A/P:  There are no active hospital problems to display for this patient.      Urology ok with DC home  Will need antibiotics for one week  F/U with Dr Larsen after she delivers for further evaluation and stent removal  "

## 2017-10-20 NOTE — PROGRESS NOTES
Urology.      IR unable to place L eft nephrostomy tube due to limited hydronephrosis.    Will take to OR and do cystoscopy and L ureteral stent insertion.  Risks and benefits discussed at length.  She agrees to procedure. Will do tonight.    Setf Larsen MD

## 2017-10-20 NOTE — OR NURSING
OB RN notified that pt in PACU and ready for fetal heart tone monitoring. Per OB MD, fetal heart tones can be checked once pt returns to inpt room.   Will monitor VS and dermatomes and return pt to room once criteria met.

## 2017-10-20 NOTE — PROGRESS NOTES
0700- Report received from TYLER Fletcher RN. Plan of care assumed. Patient is a , edc 1/2 making her 29.3 weeks.    0800- Assessment done. Patient denies any contractions, leaking of any fluid or any vaginal bleeding. States positive fetal movement. States her pain is better from yesterday. States she only has a dull ache from stent placement. Denies any need for any pain intervention at this time.     0935- reactive tracing obtained. EFM and TOCO off.     1325- patient states she is feeling better wants to konw if she can be discharged home. States she will only have a ride till 4 pm. Urology called. Message left    1335- C Eliseo PENN with urology called and updated. Will be by to see patient shortly.     1345- urology to see patient. Informed patient is cleared from urology for discharge home. Dr Burns called and updated. Will be by soon to see patient.     1420- Dr Burns at bedside. Order received to discharge patient. Patient given prescription for Macrobid.     1430- IV discontinued. Patient given  labor precautions, understanding kidney stones and to continue with kick counts. Patient states understanding of when to return to L&D. Next appt Dr Burns is on Thursday 10/26. Patient encourage to keep appt. Patient will follow up with Urology after delivery. patient given note for work and Macrobid prescription. Patient discharged home with FOB in stable condition via wheelchair.

## 2017-10-20 NOTE — OR SURGEON
Immediate Post OP Note    PreOp Diagnosis: 27 week gestation pregnancy; Left renal colic; Left hydronephrosis    PostOp Diagnosis: same    Procedure(s):  CYSTOSCOPY LEFT URETERAL STENT PLACEMENT - Wound Class: Clean Contaminated    Surgeon(s):  Stef Larsen M.D.    Anesthesiologist/Type of Anesthesia:  Anesthesiologist: Robe Ba M.D./Spinal    Surgical Staff:  Circulator: Elena Zhao R.N.  Scrub Person: Kieran Chen  Count [place]: Haydee Cabrera R.N.    Specimens:  * No specimens in log *    Estimated Blood Loss: none    Findings: purulent drainage under pressure from L ureteral stent    Complications: none.  Stable to PACU        10/19/2017 7:49 PM Stef Larsen

## 2017-10-20 NOTE — OP REPORT
DATE OF SERVICE:  10/19/2017    DATE OF SERVICE:  10/19/2017    PREOPERATIVE DIAGNOSES:  1.  A 27-week gestation pregnancy.  2.  Left hydronephrosis.  3.  Left renal colic.    POSTOPERATIVE DIAGNOSES:  1.  A 27-week gestation pregnancy.  2.  Left hydronephrosis.  3.  Left renal colic.    PROCEDURE:  Cystoscopy with insertion of left ureteral stent.    ANESTHESIA:  Spinal.    ANESTHESIOLOGIST:  Robe Ba MD    SURGEON:  Stef Larsen MD    BRIEF HISTORY:  This 22-year-old female presented with left flank pain for   several days.  She had an ultrasound done that showed mild left   hydronephrosis, an ongoing left flank pain with an elevated white count of   17,700.  An attempt to place a left nephrostomy tube today was unsuccessful   because of patient's body habitus and minimal hydronephrosis.  She now   presents for cystoscopy and insertion of a left ureteral stent to relief of   the pain.    REPORT OF OPERATION:  Under spinal anesthesia with the patient in lithotomy   position, the genitalia were prepped and draped in usual manner.  A 21-Japanese   cystoscope was introduced into the bladder.  Urethra and bladder were normal.    There was some squamous metaplasia in the area of the trigone.  The left   ureteral orifice was identified and did not have any efflux of urine.  At this   point, a ZIPwire was passed up to the left kidney.  A 4.8 Japanese x 24 cm long   double pigtail stent was then passed over the wire up to the left kidney.    The wire was removed and there was a gush of purulent urine under pressure   from the stent.  At this point, this was allowed to drain and irrigated out of   the bladder several times before it did diminish.  At this point, the patient   tolerated the procedure well.  She was cleaned up and transferred to the   recovery room in stable condition.       ____________________________________     MD ARMIDA POPE / NTS    DD:  10/19/2017 19:55:16  DT:  10/19/2017  20:13:56    D#:  [id number]  Job#:  [id number]    cc: NUZHTA SHAIKH MD

## 2017-10-20 NOTE — CARE PLAN
Problem: Safety  Goal: Will remain free from injury  Outcome: PROGRESSING AS EXPECTED  Pt ambulating with a steady gait. Pt educated about cord attachments, and how to unplug monitors when using restroom.     Problem: Infection  Goal: Will remain free from infection  Outcome: PROGRESSING AS EXPECTED  Pt has no signs/symptoms of infection at this time. Pt remains afebrile.

## 2017-10-28 ENCOUNTER — HOSPITAL ENCOUNTER (OUTPATIENT)
Facility: MEDICAL CENTER | Age: 22
End: 2017-10-28
Attending: SPECIALIST | Admitting: SPECIALIST
Payer: MEDICAID

## 2017-10-28 VITALS
HEART RATE: 90 BPM | RESPIRATION RATE: 18 BRPM | TEMPERATURE: 97.7 F | SYSTOLIC BLOOD PRESSURE: 137 MMHG | DIASTOLIC BLOOD PRESSURE: 74 MMHG

## 2017-10-28 LAB
APPEARANCE UR: ABNORMAL
COLOR UR AUTO: YELLOW
GLUCOSE UR QL STRIP.AUTO: NEGATIVE MG/DL
KETONES UR QL STRIP.AUTO: NEGATIVE MG/DL
LEUKOCYTE ESTERASE UR QL STRIP.AUTO: ABNORMAL
NITRITE UR QL STRIP.AUTO: NEGATIVE
PH UR STRIP.AUTO: 6.5 [PH]
PROT UR QL STRIP: 30 MG/DL
RBC UR QL AUTO: ABNORMAL
SP GR UR: 1.01

## 2017-10-28 PROCEDURE — 59025 FETAL NON-STRESS TEST: CPT | Performed by: SPECIALIST

## 2017-10-28 PROCEDURE — 81002 URINALYSIS NONAUTO W/O SCOPE: CPT

## 2017-10-28 NOTE — PROGRESS NOTES
1540-pt presents from home with c/o dizziness and headache today, also states that she took a blood pressure at her friend's house and that it was 190's/100's, so she came in to be checked out, no c/o leaking, bleeding, or uc's, states baby is moving normally, states that she had blurry vision when she was dizzy earlier, but is fine now, denies RUQ pain, no swelling, reflexes normal, no clonus, ua dipped with large blood due to stint in kidney and 1+ protein  1600-TC Dr Burns, report given, discharge orders received  1610-pt discharged home with understanding PTL and PIH, verbalized understanding, left ambulatory with friend

## 2017-11-08 ENCOUNTER — HOSPITAL ENCOUNTER (OUTPATIENT)
Dept: LAB | Facility: MEDICAL CENTER | Age: 22
End: 2017-11-08
Attending: SPECIALIST
Payer: MEDICAID

## 2017-11-08 LAB
GLUCOSE 1H P 50 G GLC PO SERPL-MCNC: 123 MG/DL (ref 70–139)
HCT VFR BLD AUTO: 35.5 % (ref 37–47)
HGB BLD-MCNC: 11.9 G/DL (ref 12–16)
HIV 1+2 AB+HIV1 P24 AG SERPL QL IA: NON REACTIVE

## 2017-11-08 PROCEDURE — 85014 HEMATOCRIT: CPT

## 2017-11-08 PROCEDURE — 85018 HEMOGLOBIN: CPT

## 2017-11-08 PROCEDURE — 36415 COLL VENOUS BLD VENIPUNCTURE: CPT

## 2017-11-08 PROCEDURE — 82950 GLUCOSE TEST: CPT

## 2017-11-08 PROCEDURE — 87389 HIV-1 AG W/HIV-1&-2 AB AG IA: CPT

## 2017-12-13 ENCOUNTER — HOSPITAL ENCOUNTER (INPATIENT)
Facility: MEDICAL CENTER | Age: 22
LOS: 4 days | End: 2017-12-17
Attending: SPECIALIST | Admitting: SPECIALIST
Payer: MEDICAID

## 2017-12-13 PROBLEM — O14.03 ANTEPARTUM MILD PREECLAMPSIA, THIRD TRIMESTER: Status: ACTIVE | Noted: 2017-12-13

## 2017-12-13 LAB — HOLDING TUBE BB 8507: NORMAL

## 2017-12-13 PROCEDURE — 770002 HCHG ROOM/CARE - OB PRIVATE (112)

## 2017-12-13 PROCEDURE — 84550 ASSAY OF BLOOD/URIC ACID: CPT

## 2017-12-13 PROCEDURE — 84450 TRANSFERASE (AST) (SGOT): CPT

## 2017-12-13 PROCEDURE — 82565 ASSAY OF CREATININE: CPT

## 2017-12-13 PROCEDURE — 85027 COMPLETE CBC AUTOMATED: CPT

## 2017-12-13 PROCEDURE — 700105 HCHG RX REV CODE 258

## 2017-12-13 PROCEDURE — 84520 ASSAY OF UREA NITROGEN: CPT

## 2017-12-13 PROCEDURE — 700111 HCHG RX REV CODE 636 W/ 250 OVERRIDE (IP): Performed by: SPECIALIST

## 2017-12-13 RX ORDER — OXYTOCIN 10 [USP'U]/ML
10 INJECTION, SOLUTION INTRAMUSCULAR; INTRAVENOUS
Status: DISCONTINUED | OUTPATIENT
Start: 2017-12-13 | End: 2017-12-14 | Stop reason: HOSPADM

## 2017-12-13 RX ORDER — CARBOPROST TROMETHAMINE 250 UG/ML
250 INJECTION, SOLUTION INTRAMUSCULAR
Status: DISCONTINUED | OUTPATIENT
Start: 2017-12-13 | End: 2017-12-14 | Stop reason: HOSPADM

## 2017-12-13 RX ORDER — AMPICILLIN 2 G/1
2 INJECTION, POWDER, FOR SOLUTION INTRAVENOUS ONCE
Status: COMPLETED | OUTPATIENT
Start: 2017-12-13 | End: 2017-12-13

## 2017-12-13 RX ORDER — AMPICILLIN 1 G/1
1 INJECTION, POWDER, FOR SOLUTION INTRAMUSCULAR; INTRAVENOUS EVERY 6 HOURS
Status: DISCONTINUED | OUTPATIENT
Start: 2017-12-14 | End: 2017-12-14

## 2017-12-13 RX ORDER — ALUMINA, MAGNESIA, AND SIMETHICONE 2400; 2400; 240 MG/30ML; MG/30ML; MG/30ML
30 SUSPENSION ORAL EVERY 6 HOURS PRN
Status: DISCONTINUED | OUTPATIENT
Start: 2017-12-13 | End: 2017-12-14 | Stop reason: HOSPADM

## 2017-12-13 RX ORDER — SODIUM CHLORIDE, SODIUM LACTATE, POTASSIUM CHLORIDE, CALCIUM CHLORIDE 600; 310; 30; 20 MG/100ML; MG/100ML; MG/100ML; MG/100ML
INJECTION, SOLUTION INTRAVENOUS CONTINUOUS
Status: DISPENSED | OUTPATIENT
Start: 2017-12-13 | End: 2017-12-14

## 2017-12-13 RX ORDER — SODIUM CHLORIDE, SODIUM LACTATE, POTASSIUM CHLORIDE, CALCIUM CHLORIDE 600; 310; 30; 20 MG/100ML; MG/100ML; MG/100ML; MG/100ML
INJECTION, SOLUTION INTRAVENOUS
Status: COMPLETED
Start: 2017-12-13 | End: 2017-12-13

## 2017-12-13 RX ORDER — HYDROXYZINE 50 MG/1
50 TABLET, FILM COATED ORAL EVERY 6 HOURS PRN
Status: DISCONTINUED | OUTPATIENT
Start: 2017-12-13 | End: 2017-12-14 | Stop reason: HOSPADM

## 2017-12-13 RX ADMIN — Medication 2 MILLI-UNITS/MIN: at 23:36

## 2017-12-13 RX ADMIN — AMPICILLIN SODIUM 2 G: 2 INJECTION, POWDER, FOR SOLUTION INTRAMUSCULAR; INTRAVENOUS at 23:37

## 2017-12-13 RX ADMIN — SODIUM CHLORIDE, POTASSIUM CHLORIDE, SODIUM LACTATE AND CALCIUM CHLORIDE 1000 ML: 600; 310; 30; 20 INJECTION, SOLUTION INTRAVENOUS at 22:45

## 2017-12-13 ASSESSMENT — PATIENT HEALTH QUESTIONNAIRE - PHQ9
SUM OF ALL RESPONSES TO PHQ9 QUESTIONS 1 AND 2: 0
2. FEELING DOWN, DEPRESSED, IRRITABLE, OR HOPELESS: NOT AT ALL
1. LITTLE INTEREST OR PLEASURE IN DOING THINGS: NOT AT ALL
SUM OF ALL RESPONSES TO PHQ QUESTIONS 1-9: 0

## 2017-12-13 ASSESSMENT — LIFESTYLE VARIABLES: DO YOU DRINK ALCOHOL: NO

## 2017-12-13 ASSESSMENT — COPD QUESTIONNAIRES
COPD SCREENING SCORE: 0
DO YOU EVER COUGH UP ANY MUCUS OR PHLEGM?: NO/ONLY WITH OCCASIONAL COLDS OR INFECTIONS
DURING THE PAST 4 WEEKS HOW MUCH DID YOU FEEL SHORT OF BREATH: NONE/LITTLE OF THE TIME
HAVE YOU SMOKED AT LEAST 100 CIGARETTES IN YOUR ENTIRE LIFE: NO/DON'T KNOW

## 2017-12-14 LAB
AST SERPL-CCNC: 15 U/L (ref 12–45)
BUN SERPL-MCNC: 14 MG/DL (ref 8–22)
CREAT SERPL-MCNC: 0.71 MG/DL (ref 0.5–1.4)
ERYTHROCYTE [DISTWIDTH] IN BLOOD BY AUTOMATED COUNT: 42.4 FL (ref 35.9–50)
GFR SERPL CREATININE-BSD FRML MDRD: >60 ML/MIN/1.73 M 2
HCT VFR BLD AUTO: 31.8 % (ref 37–47)
HGB BLD-MCNC: 10.8 G/DL (ref 12–16)
MCH RBC QN AUTO: 31.1 PG (ref 27–33)
MCHC RBC AUTO-ENTMCNC: 34 G/DL (ref 33.6–35)
MCV RBC AUTO: 91.6 FL (ref 81.4–97.8)
PLATELET # BLD AUTO: 271 K/UL (ref 164–446)
PMV BLD AUTO: 10.7 FL (ref 9–12.9)
RBC # BLD AUTO: 3.47 M/UL (ref 4.2–5.4)
URATE SERPL-MCNC: 5.1 MG/DL (ref 1.9–8.2)
WBC # BLD AUTO: 12.7 K/UL (ref 4.8–10.8)

## 2017-12-14 PROCEDURE — 700102 HCHG RX REV CODE 250 W/ 637 OVERRIDE(OP): Performed by: SPECIALIST

## 2017-12-14 PROCEDURE — 700111 HCHG RX REV CODE 636 W/ 250 OVERRIDE (IP)

## 2017-12-14 PROCEDURE — 10H07YZ INSERTION OF OTHER DEVICE INTO PRODUCTS OF CONCEPTION, VIA NATURAL OR ARTIFICIAL OPENING: ICD-10-PCS | Performed by: SPECIALIST

## 2017-12-14 PROCEDURE — 700101 HCHG RX REV CODE 250

## 2017-12-14 PROCEDURE — 4A1H7CZ MONITORING OF PRODUCTS OF CONCEPTION, CARDIAC RATE, VIA NATURAL OR ARTIFICIAL OPENING: ICD-10-PCS | Performed by: SPECIALIST

## 2017-12-14 PROCEDURE — 700105 HCHG RX REV CODE 258: Performed by: ANESTHESIOLOGY

## 2017-12-14 PROCEDURE — 305385 HCHG SURGICAL SERVICES 1/4 HOUR

## 2017-12-14 PROCEDURE — 3E0234Z INTRODUCTION OF SERUM, TOXOID AND VACCINE INTO MUSCLE, PERCUTANEOUS APPROACH: ICD-10-PCS | Performed by: SPECIALIST

## 2017-12-14 PROCEDURE — 700111 HCHG RX REV CODE 636 W/ 250 OVERRIDE (IP): Performed by: ANESTHESIOLOGY

## 2017-12-14 PROCEDURE — 3E033VJ INTRODUCTION OF OTHER HORMONE INTO PERIPHERAL VEIN, PERCUTANEOUS APPROACH: ICD-10-PCS | Performed by: SPECIALIST

## 2017-12-14 PROCEDURE — 700102 HCHG RX REV CODE 250 W/ 637 OVERRIDE(OP): Performed by: ANESTHESIOLOGY

## 2017-12-14 PROCEDURE — 36415 COLL VENOUS BLD VENIPUNCTURE: CPT

## 2017-12-14 PROCEDURE — 59514 CESAREAN DELIVERY ONLY: CPT

## 2017-12-14 PROCEDURE — 700105 HCHG RX REV CODE 258

## 2017-12-14 PROCEDURE — 700111 HCHG RX REV CODE 636 W/ 250 OVERRIDE (IP): Performed by: SPECIALIST

## 2017-12-14 PROCEDURE — 303615 HCHG EPIDURAL/SPINAL ANESTHESIA FOR LABOR

## 2017-12-14 PROCEDURE — 700105 HCHG RX REV CODE 258: Performed by: SPECIALIST

## 2017-12-14 PROCEDURE — A9270 NON-COVERED ITEM OR SERVICE: HCPCS | Performed by: SPECIALIST

## 2017-12-14 PROCEDURE — 306828 HCHG ANES-TIME GENERAL: Performed by: SPECIALIST

## 2017-12-14 PROCEDURE — 304964 HCHG RECOVERY ROOM TIME 1HR

## 2017-12-14 PROCEDURE — 770002 HCHG ROOM/CARE - OB PRIVATE (112)

## 2017-12-14 RX ORDER — ONDANSETRON 4 MG/1
4 TABLET, ORALLY DISINTEGRATING ORAL EVERY 6 HOURS PRN
Status: DISCONTINUED | OUTPATIENT
Start: 2017-12-14 | End: 2017-12-16

## 2017-12-14 RX ORDER — ROPIVACAINE HYDROCHLORIDE 2 MG/ML
INJECTION, SOLUTION EPIDURAL; INFILTRATION; PERINEURAL
Status: COMPLETED
Start: 2017-12-14 | End: 2017-12-14

## 2017-12-14 RX ORDER — KETOROLAC TROMETHAMINE 30 MG/ML
30 INJECTION, SOLUTION INTRAMUSCULAR; INTRAVENOUS EVERY 6 HOURS
Status: DISCONTINUED | OUTPATIENT
Start: 2017-12-15 | End: 2017-12-15

## 2017-12-14 RX ORDER — ACETAMINOPHEN 325 MG/1
325 TABLET ORAL EVERY 4 HOURS PRN
Status: DISCONTINUED | OUTPATIENT
Start: 2017-12-14 | End: 2017-12-17 | Stop reason: HOSPADM

## 2017-12-14 RX ORDER — DEXTROSE, SODIUM CHLORIDE, SODIUM LACTATE, POTASSIUM CHLORIDE, AND CALCIUM CHLORIDE 5; .6; .31; .03; .02 G/100ML; G/100ML; G/100ML; G/100ML; G/100ML
INJECTION, SOLUTION INTRAVENOUS CONTINUOUS
Status: DISCONTINUED | OUTPATIENT
Start: 2017-12-14 | End: 2017-12-17 | Stop reason: HOSPADM

## 2017-12-14 RX ORDER — OXYCODONE AND ACETAMINOPHEN 10; 325 MG/1; MG/1
1 TABLET ORAL EVERY 4 HOURS PRN
Status: DISCONTINUED | OUTPATIENT
Start: 2017-12-14 | End: 2017-12-17 | Stop reason: HOSPADM

## 2017-12-14 RX ORDER — OXYCODONE HYDROCHLORIDE AND ACETAMINOPHEN 5; 325 MG/1; MG/1
1 TABLET ORAL EVERY 4 HOURS PRN
Status: DISCONTINUED | OUTPATIENT
Start: 2017-12-14 | End: 2017-12-16

## 2017-12-14 RX ORDER — HYDROMORPHONE HYDROCHLORIDE 2 MG/ML
0.2 INJECTION, SOLUTION INTRAMUSCULAR; INTRAVENOUS; SUBCUTANEOUS
Status: DISCONTINUED | OUTPATIENT
Start: 2017-12-14 | End: 2017-12-15

## 2017-12-14 RX ORDER — IBUPROFEN 600 MG/1
600 TABLET ORAL EVERY 6 HOURS PRN
Status: DISCONTINUED | OUTPATIENT
Start: 2017-12-14 | End: 2017-12-17 | Stop reason: HOSPADM

## 2017-12-14 RX ORDER — AMPICILLIN 1 G/1
1 INJECTION, POWDER, FOR SOLUTION INTRAMUSCULAR; INTRAVENOUS EVERY 4 HOURS
Status: DISCONTINUED | OUTPATIENT
Start: 2017-12-14 | End: 2017-12-14

## 2017-12-14 RX ORDER — NALOXONE HYDROCHLORIDE 0.4 MG/ML
0.1 INJECTION, SOLUTION INTRAMUSCULAR; INTRAVENOUS; SUBCUTANEOUS PRN
Status: DISCONTINUED | OUTPATIENT
Start: 2017-12-14 | End: 2017-12-15

## 2017-12-14 RX ORDER — ONDANSETRON 2 MG/ML
INJECTION INTRAMUSCULAR; INTRAVENOUS
Status: COMPLETED
Start: 2017-12-14 | End: 2017-12-14

## 2017-12-14 RX ORDER — DIPHENHYDRAMINE HYDROCHLORIDE 50 MG/ML
12.5 INJECTION INTRAMUSCULAR; INTRAVENOUS EVERY 6 HOURS PRN
Status: DISCONTINUED | OUTPATIENT
Start: 2017-12-14 | End: 2017-12-15

## 2017-12-14 RX ORDER — KETOROLAC TROMETHAMINE 30 MG/ML
30 INJECTION, SOLUTION INTRAMUSCULAR; INTRAVENOUS EVERY 6 HOURS
Status: COMPLETED | OUTPATIENT
Start: 2017-12-15 | End: 2017-12-15

## 2017-12-14 RX ORDER — SODIUM CHLORIDE, SODIUM LACTATE, POTASSIUM CHLORIDE, CALCIUM CHLORIDE 600; 310; 30; 20 MG/100ML; MG/100ML; MG/100ML; MG/100ML
INJECTION, SOLUTION INTRAVENOUS PRN
Status: DISCONTINUED | OUTPATIENT
Start: 2017-12-14 | End: 2017-12-17 | Stop reason: HOSPADM

## 2017-12-14 RX ORDER — CITRIC ACID/SODIUM CITRATE 334-500MG
30 SOLUTION, ORAL ORAL ONCE
Status: COMPLETED | OUTPATIENT
Start: 2017-12-14 | End: 2017-12-14

## 2017-12-14 RX ORDER — ONDANSETRON 4 MG/1
4 TABLET, ORALLY DISINTEGRATING ORAL EVERY 6 HOURS PRN
Status: DISCONTINUED | OUTPATIENT
Start: 2017-12-14 | End: 2017-12-17 | Stop reason: HOSPADM

## 2017-12-14 RX ORDER — IBUPROFEN 600 MG/1
600 TABLET ORAL EVERY 6 HOURS PRN
Status: DISCONTINUED | OUTPATIENT
Start: 2017-12-14 | End: 2017-12-16

## 2017-12-14 RX ORDER — OXYCODONE HYDROCHLORIDE AND ACETAMINOPHEN 5; 325 MG/1; MG/1
1 TABLET ORAL EVERY 4 HOURS PRN
Status: DISCONTINUED | OUTPATIENT
Start: 2017-12-14 | End: 2017-12-15

## 2017-12-14 RX ORDER — CARBOPROST TROMETHAMINE 250 UG/ML
250 INJECTION, SOLUTION INTRAMUSCULAR
Status: DISCONTINUED | OUTPATIENT
Start: 2017-12-14 | End: 2017-12-17 | Stop reason: HOSPADM

## 2017-12-14 RX ORDER — DIPHENHYDRAMINE HYDROCHLORIDE 50 MG/ML
25 INJECTION INTRAMUSCULAR; INTRAVENOUS EVERY 6 HOURS PRN
Status: DISCONTINUED | OUTPATIENT
Start: 2017-12-14 | End: 2017-12-15

## 2017-12-14 RX ORDER — SODIUM CHLORIDE, SODIUM LACTATE, POTASSIUM CHLORIDE, CALCIUM CHLORIDE 600; 310; 30; 20 MG/100ML; MG/100ML; MG/100ML; MG/100ML
INJECTION, SOLUTION INTRAVENOUS
Status: COMPLETED
Start: 2017-12-14 | End: 2017-12-14

## 2017-12-14 RX ORDER — DOCUSATE SODIUM 100 MG/1
100 CAPSULE, LIQUID FILLED ORAL 2 TIMES DAILY PRN
Status: DISCONTINUED | OUTPATIENT
Start: 2017-12-14 | End: 2017-12-17 | Stop reason: HOSPADM

## 2017-12-14 RX ORDER — ONDANSETRON 2 MG/ML
4 INJECTION INTRAMUSCULAR; INTRAVENOUS EVERY 6 HOURS PRN
Status: DISCONTINUED | OUTPATIENT
Start: 2017-12-14 | End: 2017-12-17 | Stop reason: HOSPADM

## 2017-12-14 RX ORDER — ACETAMINOPHEN 325 MG/1
325 TABLET ORAL EVERY 4 HOURS PRN
Status: DISCONTINUED | OUTPATIENT
Start: 2017-12-14 | End: 2017-12-16

## 2017-12-14 RX ORDER — ONDANSETRON 2 MG/ML
4 INJECTION INTRAMUSCULAR; INTRAVENOUS EVERY 6 HOURS PRN
Status: DISCONTINUED | OUTPATIENT
Start: 2017-12-14 | End: 2017-12-16

## 2017-12-14 RX ORDER — OXYCODONE HYDROCHLORIDE AND ACETAMINOPHEN 5; 325 MG/1; MG/1
1 TABLET ORAL EVERY 4 HOURS PRN
Status: DISCONTINUED | OUTPATIENT
Start: 2017-12-14 | End: 2017-12-17 | Stop reason: HOSPADM

## 2017-12-14 RX ORDER — MORPHINE SULFATE 4 MG/ML
4 INJECTION, SOLUTION INTRAMUSCULAR; INTRAVENOUS
Status: DISCONTINUED | OUTPATIENT
Start: 2017-12-14 | End: 2017-12-17 | Stop reason: HOSPADM

## 2017-12-14 RX ORDER — SIMETHICONE 80 MG
80 TABLET,CHEWABLE ORAL 4 TIMES DAILY PRN
Status: DISCONTINUED | OUTPATIENT
Start: 2017-12-14 | End: 2017-12-17 | Stop reason: HOSPADM

## 2017-12-14 RX ORDER — METOCLOPRAMIDE HYDROCHLORIDE 5 MG/ML
10 INJECTION INTRAMUSCULAR; INTRAVENOUS ONCE
Status: COMPLETED | OUTPATIENT
Start: 2017-12-14 | End: 2017-12-14

## 2017-12-14 RX ORDER — OXYCODONE HYDROCHLORIDE AND ACETAMINOPHEN 5; 325 MG/1; MG/1
2 TABLET ORAL EVERY 4 HOURS PRN
Status: DISCONTINUED | OUTPATIENT
Start: 2017-12-14 | End: 2017-12-16

## 2017-12-14 RX ORDER — HYDROMORPHONE HYDROCHLORIDE 2 MG/ML
0.4 INJECTION, SOLUTION INTRAMUSCULAR; INTRAVENOUS; SUBCUTANEOUS
Status: DISCONTINUED | OUTPATIENT
Start: 2017-12-14 | End: 2017-12-15

## 2017-12-14 RX ORDER — OXYCODONE AND ACETAMINOPHEN 10; 325 MG/1; MG/1
1 TABLET ORAL EVERY 4 HOURS PRN
Status: DISCONTINUED | OUTPATIENT
Start: 2017-12-14 | End: 2017-12-15

## 2017-12-14 RX ORDER — ONDANSETRON 2 MG/ML
4 INJECTION INTRAMUSCULAR; INTRAVENOUS EVERY 6 HOURS PRN
Status: DISCONTINUED | OUTPATIENT
Start: 2017-12-14 | End: 2017-12-15

## 2017-12-14 RX ORDER — BUPIVACAINE HYDROCHLORIDE 2.5 MG/ML
INJECTION, SOLUTION EPIDURAL; INFILTRATION; INTRACAUDAL
Status: ACTIVE
Start: 2017-12-14 | End: 2017-12-14

## 2017-12-14 RX ORDER — SODIUM CHLORIDE, SODIUM LACTATE, POTASSIUM CHLORIDE, CALCIUM CHLORIDE 600; 310; 30; 20 MG/100ML; MG/100ML; MG/100ML; MG/100ML
1500 INJECTION, SOLUTION INTRAVENOUS ONCE
Status: COMPLETED | OUTPATIENT
Start: 2017-12-14 | End: 2017-12-14

## 2017-12-14 RX ORDER — SODIUM CHLORIDE, SODIUM LACTATE, POTASSIUM CHLORIDE, CALCIUM CHLORIDE 600; 310; 30; 20 MG/100ML; MG/100ML; MG/100ML; MG/100ML
INJECTION, SOLUTION INTRAVENOUS
Status: ACTIVE
Start: 2017-12-14 | End: 2017-12-15

## 2017-12-14 RX ORDER — METOCLOPRAMIDE HYDROCHLORIDE 5 MG/ML
10 INJECTION INTRAMUSCULAR; INTRAVENOUS EVERY 6 HOURS PRN
Status: DISCONTINUED | OUTPATIENT
Start: 2017-12-14 | End: 2017-12-15

## 2017-12-14 RX ADMIN — AMPICILLIN SODIUM 1 G: 1 INJECTION, POWDER, FOR SOLUTION INTRAMUSCULAR; INTRAVENOUS at 04:00

## 2017-12-14 RX ADMIN — ACETAMINOPHEN 650 MG: 325 TABLET, FILM COATED ORAL at 15:20

## 2017-12-14 RX ADMIN — AMPICILLIN SODIUM 1 G: 1 INJECTION, POWDER, FOR SOLUTION INTRAMUSCULAR; INTRAVENOUS at 12:00

## 2017-12-14 RX ADMIN — METOCLOPRAMIDE 10 MG: 5 INJECTION, SOLUTION INTRAMUSCULAR; INTRAVENOUS at 18:49

## 2017-12-14 RX ADMIN — SODIUM CITRATE AND CITRIC ACID MONOHYDRATE 30 ML: 500; 334 SOLUTION ORAL at 18:49

## 2017-12-14 RX ADMIN — SODIUM CHLORIDE, POTASSIUM CHLORIDE, SODIUM LACTATE AND CALCIUM CHLORIDE 1500 ML: 600; 310; 30; 20 INJECTION, SOLUTION INTRAVENOUS at 18:30

## 2017-12-14 RX ADMIN — FAMOTIDINE 20 MG: 10 INJECTION, SOLUTION INTRAVENOUS at 18:49

## 2017-12-14 RX ADMIN — SODIUM CHLORIDE, POTASSIUM CHLORIDE, SODIUM LACTATE AND CALCIUM CHLORIDE 1000 ML: 600; 310; 30; 20 INJECTION, SOLUTION INTRAVENOUS at 08:04

## 2017-12-14 RX ADMIN — SODIUM CHLORIDE, POTASSIUM CHLORIDE, SODIUM LACTATE AND CALCIUM CHLORIDE: 600; 310; 30; 20 INJECTION, SOLUTION INTRAVENOUS at 08:04

## 2017-12-14 RX ADMIN — AMPICILLIN SODIUM 1 G: 1 INJECTION, POWDER, FOR SOLUTION INTRAMUSCULAR; INTRAVENOUS at 15:22

## 2017-12-14 RX ADMIN — ONDANSETRON 4 MG: 2 INJECTION INTRAMUSCULAR; INTRAVENOUS at 09:06

## 2017-12-14 RX ADMIN — AMPICILLIN SODIUM 1 G: 1 INJECTION, POWDER, FOR SOLUTION INTRAMUSCULAR; INTRAVENOUS at 08:03

## 2017-12-14 RX ADMIN — SODIUM CHLORIDE, SODIUM LACTATE, POTASSIUM CHLORIDE, CALCIUM CHLORIDE AND DEXTROSE MONOHYDRATE: 5; 600; 310; 30; 20 INJECTION, SOLUTION INTRAVENOUS at 15:21

## 2017-12-14 RX ADMIN — ROPIVACAINE HYDROCHLORIDE 100 ML: 2 INJECTION, SOLUTION EPIDURAL; INFILTRATION at 07:52

## 2017-12-14 RX ADMIN — ROPIVACAINE HYDROCHLORIDE 100 ML: 2 INJECTION, SOLUTION EPIDURAL; INFILTRATION at 15:33

## 2017-12-14 ASSESSMENT — PAIN SCALES - GENERAL: PAINLEVEL_OUTOF10: 0

## 2017-12-14 ASSESSMENT — COPD QUESTIONNAIRES
COPD SCREENING SCORE: 0
HAVE YOU SMOKED AT LEAST 100 CIGARETTES IN YOUR ENTIRE LIFE: NO/DON'T KNOW
DO YOU EVER COUGH UP ANY MUCUS OR PHLEGM?: NO/ONLY WITH OCCASIONAL COLDS OR INFECTIONS
DURING THE PAST 4 WEEKS HOW MUCH DID YOU FEEL SHORT OF BREATH: NONE/LITTLE OF THE TIME

## 2017-12-14 ASSESSMENT — LIFESTYLE VARIABLES
DO YOU DRINK ALCOHOL: NO
EVER_SMOKED: NEVER
DO YOU DRINK ALCOHOL: NO
EVER_SMOKED: NEVER
ALCOHOL_USE: NO

## 2017-12-14 ASSESSMENT — PATIENT HEALTH QUESTIONNAIRE - PHQ9
2. FEELING DOWN, DEPRESSED, IRRITABLE, OR HOPELESS: NOT AT ALL
SUM OF ALL RESPONSES TO PHQ QUESTIONS 1-9: 0
1. LITTLE INTEREST OR PLEASURE IN DOING THINGS: NOT AT ALL
SUM OF ALL RESPONSES TO PHQ9 QUESTIONS 1 AND 2: 0

## 2017-12-14 NOTE — PROGRESS NOTES
The patient is a very pleasant 22-year-old nullipara ( 4, para 0) who is today 37 weeks and 1 day gestation. She has had her prenatal care with myself during the course of this pregnancy. She is being admitted this evening for induction of labor because of preeclampsia. Yesterday in the office her blood pressure was 150/90 and urine dipstick revealed 1+ proteinuria. On her previous visit on 2017 her blood pressure was 154/98 and urine dipstick revealed 2+ proteinuria. When I saw her in the office yesterday her cervix was found to be 2 cm dilated 50% effaced and -3 station. Here in labor and delivery her cervix is 3 cm dilated, 50% effaced, and -2 station. The fetus is in cephalic presentation. Her recent vaginal culture for group B strep came back positive for group B strep. She is not allergic to penicillins. The estimated fetal weight is 3,400 grams. The fetal heart tracing is reactive. We will start Pitocin and gradually increase Pitocin and give an analgesia as needed and anticipate an obstetrical delivery.  Carlos Eduardo Burns M.D.

## 2017-12-14 NOTE — CARE PLAN
Problem: Pain  Goal: Alleviation of Pain or a reduction in pain to the patient's comfort goal  Outcome: PROGRESSING AS EXPECTED  Patient able to rest in between UC's, discussed with patient plan for pain and pharmacological and nonpharmacological interventions, patient verbalized understanding.    Problem: Risk for Infection, Impaired Wound Healing  Goal: Remain free from signs and symptoms of infection  Outcome: PROGRESSING AS EXPECTED  Patient afebrile upon assessment with no s/sx of infection. Instructed on proper hand hygiene, patient verbalized understanding.

## 2017-12-14 NOTE — PROGRESS NOTES
The patient has had two doses of antibiotics. The fetal heart tracing is category one. I just checked her cervix and I found her cervix to be about 4 cm dilated and 50 percent effaced and about -2 station. As I was checking her cervix abundant amniotic fluid was found to be pouring out, indicating that spontaneous rupture of membranes had occurred.   Will continue pitocin and will give analgesia as needed and will anticipate an obstetrical delivery.   Carlos Eduardo Burns M.D.

## 2017-12-14 NOTE — PROGRESS NOTES
2230 - report given to Dr. Burns. Admission orders received. POC discussed, IV started, labs drawn, tolerated well. Oriented to room, call light, emergency light and Skylight system, understanding verbalized. FOB, family x2 at bedside.   2300 - Dr. Burns at bedside, SVE 3/50/-2  0200 - Patient starting to feel pain, states would like epidural when pain worsens, will let RN know when prepared for epidural. Discussed with patient preparing for epidural, patient verbalized understanding.  0600 - SVE 4/50-2  0615 - Dr. Burns at bedside SVE 4/50/-2 , SROM clear  0650 - Patient desiring epidural, bolus started  0700 - Report to JYOTHI Leiva RN

## 2017-12-14 NOTE — PROGRESS NOTES
0700 Report from Geraldine GAR RN, POC discussed assumed pt care.   22 y.o.  EDC 18 EGA 37.3 IOL for HTN. FOB Naveen at bedside, expecting a baby boy Darius. EFM & [place] in place. NOC RN had extreme difficulty keeping baby on the monitor last night, as a result she was unable to increase pitocin. Pt SROM clear at 0618. Last /84, Pt in pain requesting epidural. LR bolus started, consents signed.     Pt up to bathroom to void.   0720 EFM & [place] reapplied. 's, moderate variabiliy, accels present, no decels. BP cuff changed from regular to long sized cuff.   0740 Pt sitting up for epidural placement. Epidural placed by Dr. Valderrama without diffculty. Ropivicaine 10 ml/hr with 5 ml bolus q 20 min.   0800 Category 1 FHT, [place] shows uc's q 4-5 minutes.  Oxytocin increased to 8 brook-units. Report to Dr. Burns.   0830 IUPC placed. UC's q 4 minutes 35-45 mmhg. Pitocin increased to 10 brook-units.   0900 Pt vomiting, zofran given.   1520 SVE no change from previous exam. Pt on 20 brook-units of Pitocin. C/S called by Dr. Burns. Pitocin turned off.   Prepped and consented for Surgery.   184 Report Geraldine GAR Rn.

## 2017-12-15 LAB
ERYTHROCYTE [DISTWIDTH] IN BLOOD BY AUTOMATED COUNT: 44.4 FL (ref 35.9–50)
HCT VFR BLD AUTO: 27.2 % (ref 37–47)
HGB BLD-MCNC: 9.1 G/DL (ref 12–16)
MCH RBC QN AUTO: 31.3 PG (ref 27–33)
MCHC RBC AUTO-ENTMCNC: 33.5 G/DL (ref 33.6–35)
MCV RBC AUTO: 93.5 FL (ref 81.4–97.8)
PLATELET # BLD AUTO: 229 K/UL (ref 164–446)
PMV BLD AUTO: 11 FL (ref 9–12.9)
RBC # BLD AUTO: 2.91 M/UL (ref 4.2–5.4)
WBC # BLD AUTO: 15.2 K/UL (ref 4.8–10.8)

## 2017-12-15 PROCEDURE — 770002 HCHG ROOM/CARE - OB PRIVATE (112)

## 2017-12-15 PROCEDURE — 700102 HCHG RX REV CODE 250 W/ 637 OVERRIDE(OP): Performed by: SPECIALIST

## 2017-12-15 PROCEDURE — 90686 IIV4 VACC NO PRSV 0.5 ML IM: CPT | Performed by: SPECIALIST

## 2017-12-15 PROCEDURE — A9270 NON-COVERED ITEM OR SERVICE: HCPCS | Performed by: SPECIALIST

## 2017-12-15 PROCEDURE — 36415 COLL VENOUS BLD VENIPUNCTURE: CPT

## 2017-12-15 PROCEDURE — 90732 PPSV23 VACC 2 YRS+ SUBQ/IM: CPT | Performed by: SPECIALIST

## 2017-12-15 PROCEDURE — 90471 IMMUNIZATION ADMIN: CPT

## 2017-12-15 PROCEDURE — 700111 HCHG RX REV CODE 636 W/ 250 OVERRIDE (IP): Performed by: ANESTHESIOLOGY

## 2017-12-15 PROCEDURE — 85027 COMPLETE CBC AUTOMATED: CPT

## 2017-12-15 PROCEDURE — 700111 HCHG RX REV CODE 636 W/ 250 OVERRIDE (IP): Performed by: SPECIALIST

## 2017-12-15 RX ADMIN — KETOROLAC TROMETHAMINE 30 MG: 30 INJECTION, SOLUTION INTRAMUSCULAR at 13:09

## 2017-12-15 RX ADMIN — KETOROLAC TROMETHAMINE 30 MG: 30 INJECTION, SOLUTION INTRAMUSCULAR at 07:37

## 2017-12-15 RX ADMIN — INFLUENZA A VIRUS A/MICHIGAN/45/2015 X-275 (H1N1) ANTIGEN (FORMALDEHYDE INACTIVATED), INFLUENZA A VIRUS A/HONG KONG/4801/2014 X-263B (H3N2) ANTIGEN (FORMALDEHYDE INACTIVATED), INFLUENZA B VIRUS B/PHUKET/3073/2013 ANTIGEN (FORMALDEHYDE INACTIVATED), AND INFLUENZA B VIRUS B/BRISBANE/60/2008 ANTIGEN (FORMALDEHYDE INACTIVATED) 0.5 ML: 15; 15; 15; 15 INJECTION, SUSPENSION INTRAMUSCULAR at 12:25

## 2017-12-15 RX ADMIN — SIMETHICONE CHEW TAB 80 MG 80 MG: 80 TABLET ORAL at 21:01

## 2017-12-15 RX ADMIN — OXYCODONE HYDROCHLORIDE AND ACETAMINOPHEN 1 TABLET: 10; 325 TABLET ORAL at 14:57

## 2017-12-15 RX ADMIN — KETOROLAC TROMETHAMINE 30 MG: 30 INJECTION, SOLUTION INTRAMUSCULAR at 01:33

## 2017-12-15 RX ADMIN — OXYCODONE AND ACETAMINOPHEN 2 TABLET: 5; 325 TABLET ORAL at 21:01

## 2017-12-15 RX ADMIN — OXYCODONE HYDROCHLORIDE AND ACETAMINOPHEN 1 TABLET: 10; 325 TABLET ORAL at 10:36

## 2017-12-15 RX ADMIN — KETOROLAC TROMETHAMINE 30 MG: 30 INJECTION, SOLUTION INTRAMUSCULAR at 18:59

## 2017-12-15 RX ADMIN — PNEUMOCOCCAL VACCINE POLYVALENT 25 MCG
25; 25; 25; 25; 25; 25; 25; 25; 25; 25; 25; 25; 25; 25; 25; 25; 25; 25; 25; 25; 25; 25; 25 INJECTION, SOLUTION INTRAMUSCULAR; SUBCUTANEOUS at 12:27

## 2017-12-15 RX ADMIN — OXYCODONE HYDROCHLORIDE AND ACETAMINOPHEN 1 TABLET: 10; 325 TABLET ORAL at 04:33

## 2017-12-15 ASSESSMENT — PAIN SCALES - GENERAL
PAINLEVEL_OUTOF10: 5
PAINLEVEL_OUTOF10: 9
PAINLEVEL_OUTOF10: 8
PAINLEVEL_OUTOF10: 7
PAINLEVEL_OUTOF10: 7
PAINLEVEL_OUTOF10: 8
PAINLEVEL_OUTOF10: 3
PAINLEVEL_OUTOF10: 8
PAINLEVEL_OUTOF10: 4

## 2017-12-15 NOTE — CONSULTS
Mother reports baby has latched a few times since delivery, last at 11ish. Turned on breastfeeding edu video's for mother to view. Teaching on importance of skin to skin, feeding on demand, hunger cues, offering breast by 3 hours of last feed, breastfeeding positions & hand expression. Assisted baby to left breast, able to hand express drops of colostrum, using football hold,no latch, baby too sleepy. Encouraged mother to keep baby skin to skin and attempt latch again in 15-20 minutes. Mother has [facility] and plans to follow-up with WIC when discharged. Breastfeeding plan, breastfeed every 2-3 hours.

## 2017-12-15 NOTE — PROGRESS NOTES
POST OP DAY # 1  The patient says that she has abdominal soreness.   She says that she feels fine otherwise.   She says that she has been ambulating and urinating and tolerating oral fluids.   She says that her vaginal bleeding has been minimal.   The patient's vital signs have been stable and she has been afebrile.   She appears well developed and well nourished and relaxed and alert and comfortable and in no apparent distress.   Labs: The patient's hemoglobin went from 10.8 grams per deciliter pre-op to 9.1 grams per deciliter post-op (this morning).    We will continue to have the patient ambulate and will continue to give analgesia prn.   Carlos Eduardo Burns M.D.

## 2017-12-15 NOTE — PROGRESS NOTES
Patient arrived to room 320 at 2200. Report received from DAVID Chandler. Educated patient on cuddles system, infant identification, emergency pull cord, and skylight. Bands verified. Cuddles activated.

## 2017-12-15 NOTE — CARE PLAN
Problem: Pain  Goal: Alleviation of Pain or a reduction in pain to the patient's comfort goal  Outcome: PROGRESSING AS EXPECTED  Patient resting comfortably during C/s with no pain, instructed to let RN know if in any pain, verbalize d understanding.    Problem: Risk for Infection, Impaired Wound Healing  Goal: Remain free from signs and symptoms of infection  Outcome: PROGRESSING AS EXPECTED  Patient asymtomatic upon assessment, no s/sx of infection. Instructed on proper hand hygiene, verbalized understanding.

## 2017-12-15 NOTE — OP REPORT
DATE OF SERVICE:  2017    PREOPERATIVE DIAGNOSES:  1.  Intrauterine pregnancy at 37 weeks and 2 days gestation.  2.  Induction of labor for mild preeclampsia.  3.  Arrest of cervical dilation at 4 cm dilation.    POSTOPERATIVE DIAGNOSES:  1.  Intrauterine pregnancy at 37 weeks and 2 days gestation.  2.  Induction of labor for mild preeclampsia.  3.  Arrest of cervical dilation at 4 cm dilation.  4.  Live term male  with Apgar scores of 8 and 9 at 1 and 5 minutes   respectively and a  weight of 2,890 grams.    PROCEDURE:  Primary low transverse  section.    SURGEON:  Dr. Carlos Eduardo Burns.    ASSISTANT:  Dr. Kari Montes.    ANESTHESIA:  Continuous epidural anesthesia.    ANESTHESIOLOGIST:  Dr. Micheal Valderrama.    FINDINGS:  1.  Live term male  with Apgar scores of 8 and 9 at 1 and 5 minutes   respectively and a  weight of 2,890 grams.  2.  Normal uterus, normal fallopian tubes bilaterally, and normal ovaries   bilaterally.    SPECIMENS:  None.    COMPLICATIONS:  None.    ESTIMATED BLOOD LOSS:  Approximately 700 mL.    DESCRIPTION OF PROCEDURE:  After the appropriate consents have been obtained,   the patient was taken to the operating room and given a bolus through her   epidural catheter.  The patient was prepped and draped in the dorsal supine   position and a Aceves catheter is noted to be in place and draining urine.    Anesthesia was tested and noted to be adequate.  A Pfannenstiel incision was   made in lower abdomen, a few fingerbreadths superior to the pubic symphysis   and this incision was made with a scalpel and this incision was continued   deeply through the subcutaneous tissues using Bovie electrocautery and   hemostasis was maintained with Bovie electrocautery.  The anterior rectus   fascia was identified and incised transversely with Bovie electrocautery and   this incision was extended bilaterally with Bovie electrocautery.  The   superior aspect of the  fascial incision was then doubly grasped with Kocher   clamps and undermined from the underlying rectus muscles with both blunt   dissection and Bovie electrocautery.  Next, the inferior aspect of fascial   incision was similarly doubly grasped with Kocher clamps and undermined from   the underlying rectus muscle with both blunt dissection and Bovie   electrocautery.  The midline of the rectus muscle was identified and   separation was created in the midline of the rectus muscle with blunt   dissection with Carmen clamps and this separation was continued superiorly and   inferiorly with both blunt dissection and Bovie electrocautery.  Next, blunt   dissection through the preperitoneal adipose tissues allows identification of   the parietal peritoneum, which was grasped and incised with Metzenbaum   scissors with care taken to avoid the bladder and this incision was extended   bilaterally with Metzenbaum scissors with care taken to avoid the bladder.    Retractors were introduced to expose the anterior lower uterine segment.  The   serosa overlying the segment was grasped and incised with Metzenbaum scissors   with care taken to avoid the bladder and this incision was extended   bilaterally with Metzenbaum scissors with care taken to avoid the bladder.  A   bladder flap was created with blunt dissection.  Retractors were adjusted to   re-expose the anterior lower uterine segment, which is incised transversely   with scalpel.  The hysterotomy was extended bilaterally with bandage scissors.    Some bulging membranes are seen and are ruptured.  A hand was placed in the   intrauterine cavity around baby's head and the head was then lifted up and out   of the pelvis.  Next fundal pressure was used to deliver baby's head.    Continued fundal pressure used to easily deliver baby's body.  Baby's   nasopharynx was suctioned with a bulb syringe and the umbilical cord was   doubly clamped and cut and baby was handed to the  awaiting nursery team.  The   placenta was manually removed.  The uterus was exteriorized and the interior   of the uterus was wiped clean of products of conception.  The hysterotomy was   reapproximated first with placement of 3 interrupted mattress sutures of #1   chromic and a continuous interlocking suture of #1 chromic.  The entire length   of the hysterotomy repair is examined and hemostasis was noted to be   excellent.  The uterus and both tubes and both ovaries are examined and all   were noted to be normal.  The uterus was replaced in the peritoneal cavity.    Retractors were introduced to expose the anterior lower uterine segment and   the entire length of the hysterotomy repair was once again examined along its   entire length and once again hemostasis was noted to be excellent.  Lap and   needle counts reported to be correct at this time.  The parietal peritoneum   was identified and reapproximated with simple continuous suture using 3-0   Vicryl.  The rectus muscle was reapproximated in the midline with the   placement of multiple interrupted mattress sutures of 2-0 chromic.  Hemostasis   was noted to be excellent.  The anterior rectus fascia was identified and   then reapproximated with simple continuous suture using 1 Vicryl.  The   subcutaneous tissues were reapproximated with simple continuous suture using   3-0 Vicryl.  Finally, the skin was reapproximated by placing many interrupted   buried sutures of 4-0 Monocryl in the dermis and at least one such suture was   placed for every 1 cm of length along the entire length of the skin incision,   and the skin incision was thus reapproximated nicely in this way.  The   procedure was terminated.  Final lap and needle counts reported to be correct   x2 at the end of procedure.  The patient tolerated the procedure well and sent   to postanesthesia recovery in stable condition.       ____________________________________     NUZHAT SHAIKH MD    MED /  KIRSTEN    DD:  12/14/2017 21:08:56  DT:  12/14/2017 22:13:46    D#:  [id number]  Job#:  [id number]    cc: Kari Montes MD, NATACHA MCKEON MD

## 2017-12-15 NOTE — CARE PLAN
Problem: Safety  Goal: Will remain free from falls  Outcome: PROGRESSING AS EXPECTED  Treaded socks in place, bed in the lowest position,  call light and belongings within reach, pt call for assistance appropriately    Problem: Altered physiologic condition related to postoperative  delivery  Goal: Patient physiologically stable as evidenced by normal lochia, palpable uterine involution and vital signs within normal limits  Outcome: PROGRESSING AS EXPECTED  Fundus firm, lochia light    Problem: Pain Management  Goal: Pain level will decrease to patient's comfort goal  Outcome: PROGRESSING AS EXPECTED  Medicated per MAR with adequate pain control, hourly rounding in progress

## 2017-12-15 NOTE — OR SURGEON
Immediate Post OP Note    PreOp Diagnosis:   1.  Intrauterine pregnancy at 37 weeks and 2 days gestation.  2.  Induction of labor for mild preeclampsia.  3.  Arrest of cervical dilation at 4 cm dilation.    PostOp Diagnosis:   1.  Intrauterine pregnancy at 37 weeks and 2 days gestation.  2.  Induction of labor for mild preeclampsia.  3.  Arrest of cervical dilation at 4 cm dilation.  4. Live term male  with Apgar scores of 8 and 9 at one and five minutes respectively and a  weight of 2,890 grams    Procedure(s):  PRIMARY C SECTION - Wound Class: Contaminated  (Primary low-transverse  section)    Surgeon(s):  ANKIT Gomez M.D.    Anesthesiologist/Type of Anesthesia:  Anesthesiologist: Micheal Valderrama M.D./continuous epidural anesthesia    Surgical Staff:  Circulator: Geraldine Gonzalez R.N.  Scrub Person: Azucena Drew  L&D Baby  Nurse: Radah Jean-Baptiste R.N.    Specimens:  None    Estimated Blood Loss:  Approximately 700 mL    Findings:   Live term male  with Apgar scores of 8 and 9 at one and five minutes respectively and a  weight of 2,890 grams.  Normal uterus and normal Fallopian tubes bilaterally and normal ovaries bilaterally    Complications:   None        2017 8:33 PM Carlos Eduardo Burns

## 2017-12-15 NOTE — PROGRESS NOTES
1845 - report from JYOTHI Leiva RN.  ELLIOTT Naveen at bedside. POC discussed, questions encouraged and answered, understanding verbalized. Plan is to take patient to OR for primary C/S with Dr. Burns at 1900.  1915 - Back to OR  1934 - Procedure started, patient tolerating well  1942 - Viable male delivered, 8/9 APGARS  1943 - Spontaneous intact placenta delivered  2035 - In PACU  2115 - Discussed with Dr. Burns about patients blood pressures, patient okay to go to postpartum.   2200 - Report to Tali HERNANDEZ transferred to postpartum via [facility] in stable condition with infant in arms.

## 2017-12-15 NOTE — PROGRESS NOTES
Report received. Assumed care. Pt in bed awake. A/O x4. VSS. Responds appropriately. C/O pain, medicated per MAR, no SOB. Assessment complete. Fundus firm, lochia light. Dressing to lower abdomen in place, cdi. DC'd corley at 0900 per MD order.  in use. Discussed POC, pain control, mobility, voiding, breastfeeding times, lactation, safety, DC planning, pt verbalizes understanding. Explained importance of calling before getting OOB. Call light and belongings within reach. Bed in the lowest position. Treaded socks in place. Hourly rounding in progress. Will continue to monitor .

## 2017-12-15 NOTE — H&P
DATE OF SURGERY:  2017    IDENTIFICATION:  The patient is a very pleasant 22-year-old nullipara (   4, para 0) who is today 37 weeks and 2 days gestation.    CHIEF COMPLAINT:  Painful uterine contractions.    HOSPITAL COURSE:  The patient has had her prenatal care with myself during the   course of this pregnancy and her pregnancy has been complicated by   development of right-sided urolithiasis and Dr. Larsen (urologic) inserted   a right ureteral stent and more recently her pregnancy has been complicated   by preeclampsia.  The patient was seen in the office on 2017 and at that   time, her blood pressure was 154/98 and urine dipstick revealed 2+   proteinuria.  At the next visit on 2017, 2 days ago, her blood pressure   is 150/90 and urine dipstick revealed 1+ proteinuria.  She denied any   headaches or blurring of vision, or right upper quadrant pain or epigastric   pain or sudden swelling of her hands or her face.  However, between 2017   and 2017, she had a significant increase in her weight from 260 pounds   to 272 pounds.  When I saw her in the office on 2017, a cervical exam   revealed that her cervix is 2 cm dilated, 50% effaced, -3 station.    Arrangements were made to have her come to [facility]   labor and delivery the next day, 2017, in the evening.  She   was scheduled to arrive at 8:00 p.m. but her admission was delayed until 10:00   p.m. because of staffing issues.  So when the patient arrived in labor and   delivery, admission orders were given and labs were drawn and her cervix was   found to be about 3 cm dilated, 50% effaced and -2 station, and she was   started on intravenous ampicillin 2 grams intravenously initially and then 1   gram intravenously every 4 hours thereafter.  Her preeclampsia labs were done   and her platelet count was 271,000 and her AST (SGOT) was normal at 15 units   per liter and her  uric acid was 5.1 mg per deciliter.  Her blood pressure   initially in labor and delivery was 128/72.  Pitocin was started and gradually   increased.  I checked her cervix at about 6 o'clock this morning, now   Thursday, 2017, and while checking her cervix at about 6 o'clock this   morning, abundant amniotic fluid was seen pouring out vaginally and her cervix   was found to be 4 cm dilated, 50% effaced and -2 station.  It was therefore   noted that a spontaneous rupture of membranes had occurred.  The patient did   receive a labor epidural.  Her Pitocin was continued throughout the day and   increased throughout the day.  The nurse placed an intrauterine pressure   catheter and fetal scalp electrode and it was later deemed that her labor was   adequate.  The fetal heart tracing continued to be category 1.  I checked her   cervix at about 5:30 to 5:45 p.m. and her cervix was found to be about 4 cm   dilated and 80% effaced and -2 station.  Therefore, she had not made any   significant cervical change over the previous 12 hours.  The diagnosis of   arrest of cervical dilation at 4 cm dilation was made.  I recommended to the   patient that we proceed with a  section because of arrest of cervical   dilation at 4 cm.  She agreed with this.  Of note, I discussed with her and   explained to her in detail at length what a primary  section is and   what a primary  section involves and I have discussed with her and   explained to her in detail at length the risks and benefits and alternatives   of primary  section.  After our discussions and after answering her   questions, she told me that she would like for us to proceed with primary    section.  Arrangements for this are being made.  Of note, the patient   has received multiple doses of antibiotics since yesterday evening.    PAST MEDICAL HISTORY:  The patient does have a urolithiasis, which developed   during the course of  this pregnancy.  Otherwise, she has had no medical   illnesses (other than history of cholelithiasis).    PAST SURGICAL HISTORY:  The patient has had no previous surgeries other than   the placement of right ureteral stent during the course of this pregnancy.    MEDICATIONS:  The patient has been taking daily Macrobid and has been on   ampicillin during labor and Pitocin and analgesics and otherwise has been   taking no medications (no other medications).    ALLERGIES:  No known drug allergies.    SOCIAL HISTORY:  The patient denies smoking.  She denies use of alcohol or   illicit drugs.    REVIEW OF SYSTEMS:  GENERAL:  The patient has had some chills during labor and these resolved.    She denies feeling feverish and denies sweats.  PULMONARY:  No coughing or wheezing or chest pain or shortness of breath.  CARDIOVASCULAR:  No palpitations or dyspnea or chest pain.  GASTROINTESTINAL:  No nausea, vomiting, diarrhea, constipation.  GENITOURINARY:  The patient was having frequent and painful uterine   contractions (she was feeling her painful and frequent uterine contractions   more before she had the epidural).  MUSCULOSKELETAL:  No arthralgias or myalgias other than for hip pain and low   back pain.  NEUROLOGIC:  No headaches or syncope or seizures.    PHYSICAL EXAMINATION:  VITAL SIGNS:  Patient's vital signs are stable and she has been afebrile.  She   did have a maximum temperature of 99.9 degrees at 4 o'clock this afternoon.  GENERAL:  The patient appears well-developed and well-nourished and relaxed   and alert and comfortable and in no apparent distress.  HEENT:  Normocephalic, atraumatic.  Pupils equal, round, reactive to light and   accommodation.  Extraocular motions intact.  Pharynx clear.  NECK:  There is no thyromegaly.  There is no cervical lymphadenopathy.  CHEST AND HEART:  Regular rate and rhythm.  No murmur.  LUNGS:  Clear to auscultation bilaterally.  ABDOMEN:  Gravid about 9 months' size.  PELVIC:   The cervix is about 4 cm dilated, 80% effaced, -2 station.  EXTREMITIES:  No clubbing, cyanosis, or edema except for some mild bilateral   symmetrical lower extremity edema consistent with term pregnancy.  NEUROLOGICAL:  Nonfocal.    ASSESSMENT:  1.  Intrauterine pregnancy at 37 weeks and 2 days gestation.  2.  Induction of labor for mild preeclampsia.  3.  Arrest of cervical dilation at 4 cm dilation.    PLAN:  We will proceed with primary  section.  Please see above.  As   stated above, I have discussed with the patient and explained to the patient   in detail at length what a primary  section is and what a primary    section involves and I have discussed with her and explained to her   in detail at length the risks and benefits and alternatives of primary    section and after our discussions and after answering her questions,   she replied that she would like for us to proceed at this time with a primary    section.       ____________________________________     NUZHAT SHAIKH MD    MED / NTS    DD:  2017 19:04:09  DT:  2017 20:00:12    D#:  [id number]  Job#:  [id number]    cc: DEEPA BRAY MD

## 2017-12-16 PROCEDURE — 770002 HCHG ROOM/CARE - OB PRIVATE (112)

## 2017-12-16 PROCEDURE — 700102 HCHG RX REV CODE 250 W/ 637 OVERRIDE(OP): Performed by: SPECIALIST

## 2017-12-16 PROCEDURE — A9270 NON-COVERED ITEM OR SERVICE: HCPCS | Performed by: SPECIALIST

## 2017-12-16 PROCEDURE — 700112 HCHG RX REV CODE 229: Performed by: SPECIALIST

## 2017-12-16 PROCEDURE — 700111 HCHG RX REV CODE 636 W/ 250 OVERRIDE (IP): Performed by: SPECIALIST

## 2017-12-16 RX ADMIN — OXYCODONE HYDROCHLORIDE AND ACETAMINOPHEN 1 TABLET: 10; 325 TABLET ORAL at 09:19

## 2017-12-16 RX ADMIN — MORPHINE SULFATE 4 MG: 4 INJECTION INTRAVENOUS at 23:13

## 2017-12-16 RX ADMIN — OXYCODONE HYDROCHLORIDE AND ACETAMINOPHEN 1 TABLET: 10; 325 TABLET ORAL at 05:02

## 2017-12-16 RX ADMIN — IBUPROFEN 600 MG: 600 TABLET, FILM COATED ORAL at 14:58

## 2017-12-16 RX ADMIN — DOCUSATE SODIUM 100 MG: 100 CAPSULE ORAL at 08:49

## 2017-12-16 RX ADMIN — IBUPROFEN 600 MG: 600 TABLET, FILM COATED ORAL at 20:58

## 2017-12-16 RX ADMIN — OXYCODONE HYDROCHLORIDE AND ACETAMINOPHEN 1 TABLET: 10; 325 TABLET ORAL at 21:45

## 2017-12-16 RX ADMIN — SIMETHICONE CHEW TAB 80 MG 80 MG: 80 TABLET ORAL at 21:45

## 2017-12-16 RX ADMIN — SIMETHICONE CHEW TAB 80 MG 80 MG: 80 TABLET ORAL at 08:49

## 2017-12-16 RX ADMIN — IBUPROFEN 600 MG: 600 TABLET, FILM COATED ORAL at 08:50

## 2017-12-16 RX ADMIN — OXYCODONE HYDROCHLORIDE AND ACETAMINOPHEN 1 TABLET: 10; 325 TABLET ORAL at 01:05

## 2017-12-16 RX ADMIN — OXYCODONE HYDROCHLORIDE AND ACETAMINOPHEN 1 TABLET: 10; 325 TABLET ORAL at 17:36

## 2017-12-16 RX ADMIN — DOCUSATE SODIUM 100 MG: 100 CAPSULE ORAL at 20:57

## 2017-12-16 RX ADMIN — SIMETHICONE CHEW TAB 80 MG 80 MG: 80 TABLET ORAL at 14:58

## 2017-12-16 RX ADMIN — OXYCODONE HYDROCHLORIDE AND ACETAMINOPHEN 1 TABLET: 10; 325 TABLET ORAL at 13:21

## 2017-12-16 ASSESSMENT — PAIN SCALES - GENERAL
PAINLEVEL_OUTOF10: 8
PAINLEVEL_OUTOF10: 8
PAINLEVEL_OUTOF10: 7
PAINLEVEL_OUTOF10: 6
PAINLEVEL_OUTOF10: 7
PAINLEVEL_OUTOF10: 2
PAINLEVEL_OUTOF10: 8
PAINLEVEL_OUTOF10: 7
PAINLEVEL_OUTOF10: 8
PAINLEVEL_OUTOF10: 4
PAINLEVEL_OUTOF10: 3

## 2017-12-16 NOTE — PROGRESS NOTES
Pt assessment complete, wnl. Fundus firm at umbilicus with minimal discharge.  Pt ambulating and voiding without difficulty. Bonding well with infant. Plan of care discussed with patient for the day. Questions answered. Encouraged to call with needs, will monitor.

## 2017-12-16 NOTE — PROGRESS NOTES
Baby just returned to room after circumcision. Mother attempting to latch baby- baby sleepy. Assisted with latch, placed baby skin to skin, hand expressed colostrum to tease baby on breast, no latch, baby sleepy. Encouraged mother to keep baby skin to skin. Breastfeeding plan, breastfeed on demand every 2-3 hours.

## 2017-12-16 NOTE — PROGRESS NOTES
Postoperative day #2  Jayson tells me this morning that she has some abdominal soreness. She says she feels fine otherwise and she tells me this morning that she has no other problems or complaints. She tells me this morning that she has been ambulating and urinating and tolerating a regular diet and passing flatus.  The patient's vital signs are stable and she has been afebrile.  The patient today appears well-developed and well-nourished and relaxed and alert and comfortable and in no apparent distress.  Abdomen: Examination of the patient's abdomen today with the patient in the dorsal supine position reveals that the abdomen is obese and and the abdominal dressing is removed and examination of the lower abdominal horizontal surgical wound (Pfannenstiel wound) reveals that the wound is clean and dry and intact and healing nicely and there is certainly no evidence of any wound infection or wound disruption.  We will have the patient continued to ambulate and will give and analgesia as needed and anticipate discharge home tomorrow.  Carlos Eduardo Burns M.D.

## 2017-12-16 NOTE — CARE PLAN
Problem: Potential for postpartum infection related to surgical incision, compromised uterine condition, urinary tract or respiratory compromise  Goal: Patient will be afebrile and free from signs and symptoms of infection  Outcome: PROGRESSING AS EXPECTED  Patient shows no s/s of infection.  Will continue to monitor with hourly rounding.    Problem: Alteration in comfort related to surgical incision and/or after birth pains  Goal: Patient verbalizes acceptable pain level  Outcome: PROGRESSING AS EXPECTED  Patient states pain is tolerable with pain medication.  Will continue to reassess with hourly rounding and medicate accordingly to 0-10 pain scale.

## 2017-12-17 VITALS
HEIGHT: 62 IN | WEIGHT: 272 LBS | DIASTOLIC BLOOD PRESSURE: 71 MMHG | OXYGEN SATURATION: 98 % | BODY MASS INDEX: 50.05 KG/M2 | RESPIRATION RATE: 18 BRPM | SYSTOLIC BLOOD PRESSURE: 122 MMHG | TEMPERATURE: 98.1 F | HEART RATE: 85 BPM

## 2017-12-17 PROBLEM — N20.9 UROLITHIASIS: Status: RESOLVED | Noted: 2017-10-20 | Resolved: 2017-12-17

## 2017-12-17 PROCEDURE — A9270 NON-COVERED ITEM OR SERVICE: HCPCS | Performed by: SPECIALIST

## 2017-12-17 PROCEDURE — 700102 HCHG RX REV CODE 250 W/ 637 OVERRIDE(OP): Performed by: SPECIALIST

## 2017-12-17 PROCEDURE — 700111 HCHG RX REV CODE 636 W/ 250 OVERRIDE (IP): Performed by: SPECIALIST

## 2017-12-17 PROCEDURE — 700112 HCHG RX REV CODE 229: Performed by: SPECIALIST

## 2017-12-17 RX ORDER — FERROUS SULFATE 325(65) MG
325 TABLET ORAL DAILY
Qty: 30 TAB | Refills: 0 | Status: SHIPPED | OUTPATIENT
Start: 2017-12-17 | End: 2019-01-30

## 2017-12-17 RX ORDER — IBUPROFEN 600 MG/1
800 TABLET ORAL EVERY 8 HOURS PRN
Qty: 30 TAB | Refills: 0 | Status: SHIPPED | OUTPATIENT
Start: 2017-12-17 | End: 2019-01-30

## 2017-12-17 RX ORDER — PSEUDOEPHEDRINE HCL 30 MG
100 TABLET ORAL 2 TIMES DAILY
Qty: 60 CAP | Refills: 0 | Status: SHIPPED | OUTPATIENT
Start: 2017-12-17 | End: 2019-05-29

## 2017-12-17 RX ADMIN — SIMETHICONE CHEW TAB 80 MG 80 MG: 80 TABLET ORAL at 09:12

## 2017-12-17 RX ADMIN — OXYCODONE HYDROCHLORIDE AND ACETAMINOPHEN 1 TABLET: 10; 325 TABLET ORAL at 05:50

## 2017-12-17 RX ADMIN — OXYCODONE HYDROCHLORIDE AND ACETAMINOPHEN 1 TABLET: 10; 325 TABLET ORAL at 14:16

## 2017-12-17 RX ADMIN — DOCUSATE SODIUM 100 MG: 100 CAPSULE ORAL at 09:13

## 2017-12-17 RX ADMIN — OXYCODONE HYDROCHLORIDE AND ACETAMINOPHEN 1 TABLET: 10; 325 TABLET ORAL at 09:57

## 2017-12-17 RX ADMIN — OXYCODONE HYDROCHLORIDE AND ACETAMINOPHEN 1 TABLET: 10; 325 TABLET ORAL at 01:45

## 2017-12-17 RX ADMIN — IBUPROFEN 600 MG: 600 TABLET, FILM COATED ORAL at 02:59

## 2017-12-17 RX ADMIN — IBUPROFEN 600 MG: 600 TABLET, FILM COATED ORAL at 09:12

## 2017-12-17 RX ADMIN — MORPHINE SULFATE 4 MG: 4 INJECTION INTRAVENOUS at 13:47

## 2017-12-17 ASSESSMENT — PAIN SCALES - GENERAL
PAINLEVEL_OUTOF10: 6
PAINLEVEL_OUTOF10: 9
PAINLEVEL_OUTOF10: 7
PAINLEVEL_OUTOF10: 4
PAINLEVEL_OUTOF10: 6
PAINLEVEL_OUTOF10: 5
PAINLEVEL_OUTOF10: 7
PAINLEVEL_OUTOF10: 7
PAINLEVEL_OUTOF10: 8

## 2017-12-17 NOTE — PROGRESS NOTES
Patient assessment completed. Discussed pain management plan and patient requests pain medication as scheduled. Patient denies dizziness and headaches; states she is voiding w/o difficulty. Pt states she is having gas pains but is passing flatus. Reviewed plan of care.

## 2017-12-17 NOTE — CARE PLAN
Problem: Altered physiologic condition related to postoperative  delivery  Goal: Patient physiologically stable as evidenced by normal lochia, palpable uterine involution and vital signs within normal limits  Outcome: PROGRESSING AS EXPECTED  Lochia light, fundus firm, VSS.     Problem: Alteration in comfort related to surgical incision and/or after birth pains  Goal: Patient is able to ambulate, care for self and infant with acceptable pain level  Outcome: PROGRESSING AS EXPECTED  Patient states that she would like to be offered PRNs when available, whiteboard updated with PRN times. Alternative pain relief methods offered.

## 2017-12-17 NOTE — CARE PLAN
Problem: Potential for postpartum infection related to surgical incision, compromised uterine condition, urinary tract or respiratory compromise  Goal: Patient will be afebrile and free from signs and symptoms of infection  Outcome: MET Date Met: 12/17/17  Patient is afebrile and absent for other signs/symptoms of infection. Vital signs WDL.  Will continue to monitor patient condition.

## 2017-12-17 NOTE — PROGRESS NOTES
Postoperative day #3  The patient is today postoperative day #3 status post primary  section. She says that other than for some abdominal soreness she feels fine and has no problems or complaints. She tells me today that she is ambulating and urinating and tolerating a regular diet and passing flatus. She says that she wishes to go home today.  The patient's vital signs are stable and she is afebrile.  The patient appears well-developed and well-nourished and relaxed and alert and comfortable and in no apparent distress.  Of note, the patient's preoperative hemoglobin was 10.8 g/dL on 2017 at 11:10 PM and her postoperative hemoglobin was 9.1 g/dL on December 15, 2017, at 4:37 AM.  We will discharge the patient home today with prescriptions for Percocet and ibuprofen and iron sulfate and stool softeners.  I asked her to follow up with me in the office in about 2 weeks for postoperative visit and I asked her to call or contact me at any time should she ever have any problems or questions or complaints and she said that she would do so.  Carlos Eduardo Burns M.D.

## 2017-12-17 NOTE — DISCHARGE INSTRUCTIONS
POSTPARTUM DISCHARGE INSTRUCTIONS FOR MOM    YOB: 1995   Age: 22 y.o.               Admit Date: 2017     Discharge Date: 2017  Attending Doctor:  Carlos Eduardo Burns M.D.                  Allergies:  Patient has no known allergies.    Discharged to home by car. Discharged via wheelchair, hospital escort: Yes.  Special equipment needed: Not Applicable  Belongings with: Personal  Be sure to schedule a follow-up appointment with your primary care doctor or any specialists as instructed.     Discharge Plan:   Diet Plan: Discussed  Activity Level: Discussed  Confirmed Follow up Appointment: Patient to Call and Schedule Appointment  Confirmed Symptoms Management: Discussed  Medication Reconciliation Updated: Yes  Pneumococcal Vaccine Given - only chart on this line when given: Given (See MAR)  Influenza Vaccine Indication: Indicated: 9 to 64 years of age  Influenza Vaccine Given - only chart on this line when given: Influenza Vaccine Given (See MAR)    REASONS TO CALL YOUR OBSTETRICIAN:  1.   Persistent fever or shaking chills (Temperature higher than 100.4)  2.   Heavy bleeding (soaking more than 1 pad per hour); Passing clots  3.   Foul odor from vagina  4.   Mastitis (Breast infection; breast pain, chills, fever, redness)  5.   Urinary pain, burning or frequency  6  7.   Abdominal incision infection  8.   Severe depression longer than 24 hours    HAND WASHING  · Prior to handling the baby.  · Before breastfeeding or bottle feeding baby.  · After using the bathroom or changing the baby's diaper.    WOUND CARE  Ask your physician for additional care instructions.  In general:    ·  Incision:      · Keep clean and dry.    · Do NOT lift anything heavier than your baby for up to 6 weeks.    · There should not be any opening or pus.      VAGINAL CARE  · Nothing inside vagina for 6 weeks: no sexual intercourse, tampons or douching.  · Bleeding may continue for 2-4 weeks.  Amount may vary.   "  · Call your physician for heavy bleeding which means soaking more than 1 pad per hour    BIRTH CONTROL  · It is possible to become pregnant at any time after delivery and while breastfeeding.  · Plan to discuss a method of birth control with your physician at your follow up visit. visit.    DIET AND ELIMINATION  · Eating more fiber (bran cereal, fruits, and vegetables) and drinking plenty of fluids will help to avoid constipation.  · Urinary frequency after childbirth is normal.    POSTPARTUM BLUES  During the first few days after birth, you may experience a sense of the \"blues\" which may include impatience, irritability or even crying.  These feeling come and go quickly.  However, as many as 1 in 10 women experience emotional symptoms known as postpartum depression.    Postpartum depression:  May start as early as the second or third day after delivery or take several weeks or months to develop.  Symptoms of \"blues\" are present, but are more intense:  Crying spells; loss of appetite; feelings of hopelessness or loss of control; fear of touching the baby; over concern or no concern at all about the baby; little or no concern about your own appearance/caring for yourself; and/or inability to sleep or excessive sleeping.  Contact your physician if you are experiencing any of these symptoms.    Crisis Hotline:  · [place] Crisis Hotline:  5-201-LQTSRIF  Or 1-405.819.2649  · [place] Crisis Hotline:  1-687.575.8477  Or 850-695-7420    PREVENTING SHAKEN BABY:  If you are angry or stressed, PUT THE BABY IN THE CRIB, step away, take some deep breaths, and wait until you are calm to care for the baby.  DO NOT SHAKE THE BABY.  You are not alone, call a supporter for help.    · Crisis Call Center 24/7 crisis line 999-208-3442 or 1-890.310.3407  · You can also text them, text \"ANSWER\" to 470205    QUIT SMOKING/TOBACCO USE:  I understand the use of any tobacco products increases my chance of suffering from future heart disease " and could cause other illnesses which may shorten my life. Quitting the use of tobacco products is the single most important thing I can do to improve my health. For further information on smoking / tobacco cessation call a Toll Free Quit Line at 1-476.751.9001 (*[employer] [place]) or 1-160.951.5448 (American Lung Association) or you can access the web based program at www.lungusa.org.    · [place] Tobacco Users Help Line:  (858) 269-8778       Toll Free: 1-213.590.9093  · Quit Tobacco Program [facility] (851)809-8556    DEPRESSION / SUICIDE RISK:  As you are discharged from this [facility], it is important to learn how to keep safe from harming yourself.    Recognize the warning signs:  · Abrupt changes in personality, positive or negative- including increase in energy   · Giving away possessions  · Change in eating patterns- significant weight changes-  positive or negative  · Change in sleeping patterns- unable to sleep or sleeping all the time   · Unwillingness or inability to communicate  · Depression  · Unusual sadness, discouragement and loneliness  · Talk of wanting to die  · Neglect of personal appearance   · Rebelliousness- reckless behavior  · Withdrawal from people/activities they love  · Confusion- inability to concentrate     If you or a loved one observes any of these behaviors or has concerns about self-harm, here's what you can do:  · Talk about it- your feelings and reasons for harming yourself  · Remove any means that you might use to hurt yourself (examples: pills, rope, extension cords, firearm)  · Get professional help from the community (Mental Health, Substance Abuse, psychological counseling)  · Do not be alone:Call your Safe Contact- someone whom you trust who will be there for you.  · Call your local CRISIS HOTLINE 434-0390 or 370-277-8407  · Call your local Children's Mobile Crisis Response Team [place] (240) 654-3753 or  www.[place]ogi  · Call the toll free National Suicide Prevention Hotlines   · National Suicide Prevention Lifeline 459-732-QIID (1293)  · National Hope Line Network 800-SUICIDE (091-3983)    DISCHARGE SURVEY:  Thank you for choosing [facility].  We hope we provided you with very good care.  You may be receiving a survey in the mail.  Please fill it out.  Your opinion is valuable to us.    ADDITIONAL EDUCATIONAL MATERIALS GIVEN TO PATIENT:        My signature on this form indicates that:  1.  I have reviewed and understand the above information  2.  My questions regarding this information have been answered to my satisfaction.  3.  I have formulated a plan with my discharge nurse to obtain my prescribed medication for home.

## 2017-12-17 NOTE — CARE PLAN
Problem: Altered physiologic condition related to postoperative  delivery  Goal: Patient physiologically stable as evidenced by normal lochia, palpable uterine involution and vital signs within normal limits  Outcome: MET Date Met: 17  Patient is physiologically stable as evidenced by scant lochia rubra, firm fundus two fingerbreadths below the umbilicus, and vital signs WDL. Will continue to monitor patient condition.

## 2017-12-17 NOTE — PROGRESS NOTES
Patient assessment completed. She is wishing to give infant enfamil. Educated regarding formula. She wishes to be offered pain medication when it is available. Rounding in place.

## 2017-12-17 NOTE — PROGRESS NOTES
Follow-up visit with couplet. MOB in pain at this time, and has asked primary RN for formula. Will do both, but has been latching infant, and denies pain with latch.     Discussed discharge feeding plan-   1- To breastfeed first.  2- if latch or breastfeeding is suboptimal, can supplement according to guidelines   3. Use breastpump to protect supply.     ANNIE has WIC on Un[place], and encouraged her to follow up for lactation support at [place] office after discharge.     ANNIE has no other questions or concerns regarding breastfeeding. Encouraged to call for lactation support as needed through hospitalization.

## 2017-12-18 NOTE — PROGRESS NOTES
Patient off unit with infant and spouse and hospital escort at 1426. Discharge instructions reviewed and signed; copy given to patient and placed in chart. F/U appointment discussed. Patient verbalizes understanding.

## 2018-01-20 ENCOUNTER — NON-PROVIDER VISIT (OUTPATIENT)
Dept: URGENT CARE | Facility: PHYSICIAN GROUP | Age: 23
End: 2018-01-20

## 2018-01-20 DIAGNOSIS — Z02.1 PRE-EMPLOYMENT DRUG SCREENING: ICD-10-CM

## 2018-01-20 LAB
AMP AMPHETAMINE: NEGATIVE
COC COCAINE: NEGATIVE
INT CON NEG: NEGATIVE
INT CON POS: POSITIVE
MET METHAMPHETAMINES: NEGATIVE
OPI OPIATES: NEGATIVE
PCP PHENCYCLIDINE: NEGATIVE
POC DRUG COMMENT 753798-OCCUPATIONAL HEALTH: NORMAL
THC: NEGATIVE

## 2018-01-20 PROCEDURE — 80305 DRUG TEST PRSMV DIR OPT OBS: CPT | Performed by: NURSE PRACTITIONER

## 2018-02-19 NOTE — DISCHARGE SUMMARY
DATE OF ADMISSION:  2017    DATE OF DISCHARGE:  2017    ADMISSION DIAGNOSES:  1.  Intrauterine pregnancy at 37 weeks and 1 day gestation.  2.  Mild preeclampsia.    DISCHARGE DIAGNOSES:  1.  Intrauterine pregnancy at 37 weeks and 1 day gestation.  2.  Mild preeclampsia.  3.  Arrest of cervical dilation at 4 cm dilation.  4.  Live term male  with Apgar scores of 8 and 9 at 1 and 5 minutes   respectively and a  weight of 2890 grams  5.  Maternal postpartum/postoperative anemia, asymptomatic.    PROCEDURES:  Primary low transverse  section.    COMPLICATIONS:  None.    HOSPITAL COURSE:  The patient is a very pleasant 22-year-old (on admission    4, para 0) with prenatal care with myself during the course of her   pregnancy and she was admitted to [facility] labor and   delivery in the evening of 2017 for induction of labor for preeclampsia.    On admission, she was noted to be 37 weeks and 1 day gestation.  She had her   prenatal care with myself during the course of this pregnancy.  The previous   day (the day prior to admission), her blood pressure in the office was 150/90   and urine dipstick revealed 1+ proteinuria.  On her previous visit, namely   2017, her blood pressure had been found to be 154/98 and urine dipstick   at that time revealed 2+ proteinuria.  She was diagnosed as having   preeclampsia and the decision was made to admit her for induction of labor   because of mild preeclampsia at term.  When she had been seen in the office   the previous day (the day prior to admission), her cervix had been found to be   about 2 cm dilated and 50% effaced and -3 station, and in labor and delivery,   her cervix was found to be 3 cm dilated, 50% effaced, and -2 station and the   fetus was noted to be in cephalic presentation, and it was noted that her   recent vaginal culture for group B strep had come back positive for group B   strep and she is  not allergic to penicillin.  The estimated fetal weight was   3400 grams.  On admission, the fetal heart tracing was found to be reactive.    Pitocin was started and gradually increased.  She was started on intravenous   antibiotics, namely ampicillin, per protocol, because of positive vaginal   culture for group B strep.  In the morning of the next day at about 6:00-6:30   in the morning (2017), I checked her cervix and found her cervix to be   about 4 cm dilated and 50% effaced and -2 station and as I was checking her   cervix, at that time abundant amniotic fluid was found to be pouring out of   her vagina indicating spontaneous rupture of membranes had occurred.  The   Pitocin was increased and she was given analgesia as needed.  Then, in the   late afternoon to evening, after she had been laboring throughout the day, her   cervix was checked at about 5:30-5:45 p.m. and found to be about 4 cm   dilated, 80% effaced, and -2 station.  Therefore, she had not made any   significant cervical change over the previous 12 hours.  Of note, the nurse   had placed internal monitors including an intrauterine pressure catheter a   number of hours before and adequately had been documented.  Also, of note, her   blood pressures did decrease somewhat after admission and her preeclampsia   labs were normal.  I recommended to the patient that we proceed with    section because of arrest of cervical dilation at 4 cm and she agreed and so   that evening, 2017, a primary low transverse  section was   performed and the patient was delivered a live term male  with Apgar   scores of 8 and 9 at 1 and 5 minutes respectively and a  weight of 2890   grams.  Estimated blood loss was approximately 700 mL  The next day,   12/15/2017, postoperative day #1,  the patient said that she had abdominal   soreness and that she felt fine otherwise and she was ambulating and urinating   and tolerating oral  fluids and she said that her bleeding was minimal and her   vital signs were stable and she was afebrile and she appeared well developed   and well nourished and relaxed and alert and comfortable and in no apparent   distress and it was noted that her preoperative hemoglobin had been 10.8   grams/dL and her postoperative hemoglobin was 9.1 grams/dL.  The next day,   12/16/2017, postoperative day #2,  the patient said that she had some   abdominal soreness and said that she felt fine otherwise and that she had no   other problems or complaints and said that she was ambulating and urinating   and tolerating regular diet and passing flatus.  Her vital signs continued to   be stable and she continued to be afebrile.  An examination of the patient's   abdomen on that day with the patient in the dorsal supine position revealed   that the abdomen was obese and that the abdominal dressing was removed and   that the lower abdominal horizontal surgical wound (Pfannenstiel wound)   revealed that the wound was clean and dry and intact and healing nicely and   there was no evidence of wound infection or wound disruption.  The next day,   12/17/2017, postoperative day #3, the patient said that other than for some   abdominal soreness, she felt fine and had no problems or complaints and she   was ambulating and urinating and tolerating a regular diet and passing flatus   and said that she wanted to go home and she was discharged on that day with   prescriptions for Percocet and ibuprofen and iron sulfate and stool softeners,   and I asked her to follow up with me in the office in about 2 weeks for   postoperative visit and I asked her to call or contact me at any time should   she ever have any problems or questions or complaints and she said that she   would do so.       ____________________________________     NUZHAT SHAIKH MD    MED / NTS    DD:  02/18/2018 12:14:03  DT:  02/18/2018 19:58:06    D#:  [id number]  Job#:  [id number]

## 2018-07-02 ENCOUNTER — NON-PROVIDER VISIT (OUTPATIENT)
Dept: URGENT CARE | Facility: PHYSICIAN GROUP | Age: 23
End: 2018-07-02

## 2018-07-02 DIAGNOSIS — Z02.1 PRE-EMPLOYMENT DRUG SCREENING: ICD-10-CM

## 2018-07-02 LAB
AMP AMPHETAMINE: NORMAL
COC COCAINE: NORMAL
INT CON NEG: NEGATIVE
INT CON POS: POSITIVE
MET METHAMPHETAMINES: NORMAL
OPI OPIATES: NORMAL
PCP PHENCYCLIDINE: NORMAL
POC DRUG COMMENT 753798-OCCUPATIONAL HEALTH: NORMAL
THC: NORMAL

## 2018-07-02 PROCEDURE — 80305 DRUG TEST PRSMV DIR OPT OBS: CPT | Performed by: PHYSICIAN ASSISTANT

## 2019-01-28 ENCOUNTER — OFFICE VISIT (OUTPATIENT)
Dept: URGENT CARE | Facility: CLINIC | Age: 24
End: 2019-01-28

## 2019-01-28 VITALS
WEIGHT: 252 LBS | DIASTOLIC BLOOD PRESSURE: 64 MMHG | HEIGHT: 62 IN | OXYGEN SATURATION: 98 % | SYSTOLIC BLOOD PRESSURE: 122 MMHG | BODY MASS INDEX: 46.38 KG/M2 | HEART RATE: 112 BPM | TEMPERATURE: 98.7 F | RESPIRATION RATE: 16 BRPM

## 2019-01-28 DIAGNOSIS — L02.416 ABSCESS OF LEFT LEG: ICD-10-CM

## 2019-01-28 PROCEDURE — 10061 I&D ABSCESS COMP/MULTIPLE: CPT | Performed by: PHYSICIAN ASSISTANT

## 2019-01-28 RX ORDER — CLINDAMYCIN HYDROCHLORIDE 300 MG/1
300 CAPSULE ORAL 3 TIMES DAILY
Qty: 20 CAP | Refills: 0 | Status: SHIPPED | OUTPATIENT
Start: 2019-01-28 | End: 2019-01-30

## 2019-01-28 RX ORDER — HYDROCODONE BITARTRATE AND ACETAMINOPHEN 5; 325 MG/1; MG/1
1 TABLET ORAL EVERY 4 HOURS PRN
Qty: 18 TAB | Refills: 0 | Status: SHIPPED | OUTPATIENT
Start: 2019-01-28 | End: 2019-01-31

## 2019-01-28 ASSESSMENT — ENCOUNTER SYMPTOMS
FEVER: 0
COUGH: 0
PALPITATIONS: 0
ROS SKIN COMMENTS: BOIL
SHORTNESS OF BREATH: 0

## 2019-01-29 NOTE — PATIENT INSTRUCTIONS
Abscess  Care After  An abscess (also called a boil or furuncle) is an infected area that contains a collection of pus. Signs and symptoms of an abscess include pain, tenderness, redness, or hardness, or you may feel a moveable soft area under your skin. An abscess can occur anywhere in the body. The infection may spread to surrounding tissues causing cellulitis. A cut (incision) by the surgeon was made over your abscess and the pus was drained out. Gauze may have been packed into the space to provide a drain that will allow the cavity to heal from the inside outwards. The boil may be painful for 5 to 7 days. Most people with a boil do not have high fevers. Your abscess, if seen early, may not have localized, and may not have been lanced. If not, another appointment may be required for this if it does not get better on its own or with medications.  HOME CARE INSTRUCTIONS   · Only take over-the-counter or prescription medicines for pain, discomfort, or fever as directed by your caregiver.  · When you bathe, soak and then remove gauze or iodoform packs at least daily or as directed by your caregiver. You may then wash the wound gently with mild soapy water. Repack with gauze or do as your caregiver directs.  SEEK IMMEDIATE MEDICAL CARE IF:   · You develop increased pain, swelling, redness, drainage, or bleeding in the wound site.  · You develop signs of generalized infection including muscle aches, chills, fever, or a general ill feeling.  · An oral temperature above 102° F (38.9° C) develops, not controlled by medication.  See your caregiver for a recheck if you develop any of the symptoms described above. If medications (antibiotics) were prescribed, take them as directed.  Document Released: 07/06/2006 Document Revised: 03/11/2013 Document Reviewed: 03/02/2009  [employer]® Patient Information ©2014 [employer].

## 2019-01-29 NOTE — PROGRESS NOTES
Subjective:      Jayson Phoenix is a 23 y.o. female who presents with Leg Pain (cyst in the left thigh x 3 days)            Cyst   This is a new problem. The current episode started in the past 7 days. The problem occurs constantly. The problem has been rapidly worsening. Pertinent negatives include no chest pain, coughing or fever. Associated symptoms comments: Boil on left leg  . Nothing aggravates the symptoms. She has tried nothing for the symptoms.       Review of Systems   Constitutional: Negative for fever and malaise/fatigue.   Respiratory: Negative for cough and shortness of breath.    Cardiovascular: Negative for chest pain and palpitations.   Skin:        Boil     All other systems reviewed and are negative.      PMH:  has a past medical history of Allergy; Asthma; GERD (gastroesophageal reflux disease); Headache(784.0); Hypertension; IBD (inflammatory bowel disease); Pregnant; Substance abuse (HCC); and Urinary tract infection, site not specified. She also has no past medical history of Addisons disease (HCC); Adrenal disorder (HCC); Anemia; Anxiety; Arrhythmia; Arthritis; Blood transfusion; Cancer (HCC); CATARACT; CHF (congestive heart failure) (HCC); Clotting disorder (HCC); COPD; Cushings syndrome (HCC); Depression; Diabetes; Diabetic neuropathy (HCC); EMPHYSEMA; Glaucoma; Goiter; Heart attack (HCC); Heart murmur; HIV (human immunodeficiency virus infection); Hyperlipidemia; Kidney disease; Meningitis; Migraine; Muscle disorder; OSTEOPOROSIS; Parathyroid disorder (HCC); Pituitary disease (HCC); Seizure (HCC); Sickle cell disease (HCC); Stroke (HCC); Thyroid disease; Tuberculosis; or Ulcer.  MEDS:   Current Outpatient Prescriptions:   •  clindamycin (CLEOCIN) 300 MG Cap, Take 1 Cap by mouth 3 times a day for 10 days., Disp: 20 Cap, Rfl: 0  •  HYDROcodone-acetaminophen (NORCO) 5-325 MG Tab per tablet, Take 1 Tab by mouth every four hours as needed for up to 3 days., Disp: 18 Tab, Rfl: 0  •   "ibuprofen (MOTRIN) 600 MG Tab, Take 1.5 Tabs by mouth every 8 hours as needed for Moderate Pain (For cramping after delivery; do not give if patient is receiving ketorolac (Toradol)). (Patient not taking: Reported on 1/28/2019), Disp: 30 Tab, Rfl: 0  •  docusate sodium 100 MG Cap, Take 100 mg by mouth 2 times a day. (Patient not taking: Reported on 1/28/2019), Disp: 60 Cap, Rfl: 0  •  ferrous sulfate 325 (65 Fe) MG tablet, Take 1 Tab by mouth every day. (Patient not taking: Reported on 1/28/2019), Disp: 30 Tab, Rfl: 0  ALLERGIES: No Known Allergies  SURGHX:   Past Surgical History:   Procedure Laterality Date   • PRIMARY C SECTION  12/14/2017    Procedure: PRIMARY C SECTION;  Surgeon: Carlos Eduardo Burns M.D.;  Location: LABOR AND DELIVERY;  Service: Labor and Delivery   • CYSTOSCOPY STENT PLACEMENT Left 10/19/2017    Procedure: CYSTOSCOPY STENT PLACEMENT;  Surgeon: Stef Larsen M.D.;  Location: SURGERY [facility];  Service: Urology   • TONSILLECTOMY  1998     SOCHX:  reports that she has never smoked. She has never used smokeless tobacco. She reports that she drinks about 1.0 oz of alcohol per week . She reports that she does not use drugs.  FH: Family history was reviewed, no pertinent findings to report  Medications, Allergies, and current problem list reviewed today in Epic     Objective:     /64 (BP Location: Left arm, Patient Position: Sitting, BP Cuff Size: Adult)   Pulse (!) 112   Temp 37.1 °C (98.7 °F) (Temporal)   Resp 16   Ht 1.575 m (5' 2\")   Wt 114.3 kg (252 lb)   SpO2 98%   Breastfeeding? Unknown   BMI 46.09 kg/m²      Physical Exam   Constitutional: She appears well-developed and well-nourished.   Cardiovascular: Normal rate, regular rhythm and normal heart sounds.    Pulmonary/Chest: Effort normal and breath sounds normal.   Skin: Skin is warm and dry.        Psychiatric: She has a normal mood and affect. Her behavior is normal. Judgment and thought content normal.   Vitals " "reviewed.              Assessment/Plan:   Procedure: Incision and Drainage  -Risks, benefits, and alternatives discussed. Risks including infection, bleeding, nerve damage, and poor cosmetic outcome  -Sterile technique throughout  -Local anesthesia with 2% lidocaine with epinephrine  -Incision with #11 blade into fluctuant area with purulent material expressed  -Culture obtained and packaged for lab  -Cavity probed and any loculations bluntly taken down with hemostat  -Irrigated copiously with NS  -Packed with 1/4\" gauze  -Minimal bleeding with good hemostasis achieved  -The patient tolerated the procedure well      1. Abscess of left leg    - clindamycin (CLEOCIN) 300 MG Cap; Take 1 Cap by mouth 3 times a day for 10 days.  Dispense: 20 Cap; Refill: 0  - HYDROcodone-acetaminophen (NORCO) 5-325 MG Tab per tablet; Take 1 Tab by mouth every four hours as needed for up to 3 days.  Dispense: 18 Tab; Refill: 0  - Consent for Opiate Prescription  - return in 2 days for recheck  "

## 2019-01-30 ENCOUNTER — OFFICE VISIT (OUTPATIENT)
Dept: URGENT CARE | Facility: CLINIC | Age: 24
End: 2019-01-30

## 2019-01-30 VITALS
SYSTOLIC BLOOD PRESSURE: 122 MMHG | HEART RATE: 92 BPM | DIASTOLIC BLOOD PRESSURE: 80 MMHG | RESPIRATION RATE: 20 BRPM | OXYGEN SATURATION: 96 % | TEMPERATURE: 98.1 F

## 2019-01-30 DIAGNOSIS — Z51.89 ABSCESS RE-CHECK: ICD-10-CM

## 2019-01-30 PROCEDURE — 99024 POSTOP FOLLOW-UP VISIT: CPT | Performed by: NURSE PRACTITIONER

## 2019-01-30 ASSESSMENT — ENCOUNTER SYMPTOMS
EYE PAIN: 0
NAUSEA: 0
CHILLS: 0
MYALGIAS: 0
DIZZINESS: 0
SORE THROAT: 0
FEVER: 0
SHORTNESS OF BREATH: 0
VOMITING: 0

## 2019-01-30 NOTE — LETTER
January 30, 2019         Patient: Jayson Phoenix   YOB: 1995   Date of Visit: 1/30/2019           To Whom it May Concern:    Jayson Phoenix was seen in my clinic on 1/30/2019. She may return to work on 2/1/19.    If you have any questions or concerns, please don't hesitate to call.        Sincerely,           MICHELA Stringer.  Electronically Signed

## 2019-01-31 ENCOUNTER — OFFICE VISIT (OUTPATIENT)
Dept: URGENT CARE | Facility: CLINIC | Age: 24
End: 2019-01-31

## 2019-01-31 VITALS
HEIGHT: 62 IN | DIASTOLIC BLOOD PRESSURE: 72 MMHG | TEMPERATURE: 98.4 F | BODY MASS INDEX: 46.38 KG/M2 | RESPIRATION RATE: 16 BRPM | SYSTOLIC BLOOD PRESSURE: 122 MMHG | HEART RATE: 84 BPM | OXYGEN SATURATION: 97 % | WEIGHT: 252 LBS

## 2019-01-31 DIAGNOSIS — Z51.89 VISIT FOR WOUND CHECK: ICD-10-CM

## 2019-01-31 PROCEDURE — 99024 POSTOP FOLLOW-UP VISIT: CPT | Performed by: NURSE PRACTITIONER

## 2019-01-31 NOTE — PROGRESS NOTES
Subjective:   Jayson Phoenix is a 23 y.o. female who presents for Wound Check        Wound Check   She was originally treated 2 to 3 days ago. Previous treatment included I&D of abscess and oral antibiotics. The maximum temperature noted was less than 100.4 F. The temperature was taken using a tympanic thermometer. There has been colored discharge from the wound. The redness has not changed. There is no swelling present. The pain has not changed. She has no difficulty moving the affected extremity or digit.     Review of Systems   Constitutional: Negative for chills and fever.   HENT: Negative for sore throat.    Eyes: Negative for pain.   Respiratory: Negative for shortness of breath.    Cardiovascular: Negative for chest pain.   Gastrointestinal: Negative for nausea and vomiting.   Genitourinary: Negative for hematuria.   Musculoskeletal: Negative for myalgias.   Skin: Negative for rash.        Left thigh with dressing and packing in place.    Neurological: Negative for dizziness.     No Known Allergies   Objective:   /80 (BP Location: Right arm, Patient Position: Sitting, BP Cuff Size: Large adult)   Pulse 92   Temp 36.7 °C (98.1 °F)   Resp 20   SpO2 96%   Physical Exam   Skin: There is erythema.              Assessment/Plan:   1. Abscess re-check  Packing removed.  Forceps used  see if there are any loculations as there is palpable induration.    Minimal purulent drainage expressed, flushed and irrigated with NS.  Repacked abscess.    Marked surrounding erythema as patient unsure if it has improved and/or worsened.  Patient will return in 24 hours for wound check to ensure erythema is not moving outside marked margins.  Advised to continue taking prescribed antibiotics as directed.  Differential diagnosis, natural history, supportive care, and indications for immediate follow-up discussed.

## 2019-01-31 NOTE — LETTER
January 31, 2019        Jayson Yokahlil Phoenix  [address]  [place] [address]        Jayson was seen in our clinic today and she is excused from work for tomorrow. Thank you.  If you have any questions or concerns, please don't hesitate to call.        Sincerely,        KAHLIL Butts.ZOE.    Electronically Signed

## 2019-02-01 NOTE — PROGRESS NOTES
Patient is 23 year old female with open wound in left upper thigh. She has I&D for this and is here for recheck. Wound is without induration or swelling. Dressing and packing removed and irrigated with NS with clear return. No packing placed at this time, only bandage. Follow up as needed.

## 2019-02-02 ENCOUNTER — TELEPHONE (OUTPATIENT)
Dept: URGENT CARE | Facility: CLINIC | Age: 24
End: 2019-02-02

## 2019-02-02 NOTE — TELEPHONE ENCOUNTER
Pt was seen a few times in urgent care and was given a note for work stating that she can return to work on 2/2/19. Pt's called and states she thought she wasn't to return to work until 2/4/19. Nothing in the chart notes reflects pt not returning to work until the 4th. Pt could be seen again and have leg reevaluated and possibly given new note, Or pt can wait till Esvin returns to work and speak with her then about the note. Kirill left message for pt to call back regarding note options.

## 2019-02-03 ENCOUNTER — OFFICE VISIT (OUTPATIENT)
Dept: URGENT CARE | Facility: CLINIC | Age: 24
End: 2019-02-03
Payer: OTHER MISCELLANEOUS

## 2019-02-03 VITALS
SYSTOLIC BLOOD PRESSURE: 120 MMHG | HEART RATE: 99 BPM | TEMPERATURE: 98.7 F | HEIGHT: 62 IN | RESPIRATION RATE: 16 BRPM | DIASTOLIC BLOOD PRESSURE: 62 MMHG | WEIGHT: 254 LBS | OXYGEN SATURATION: 97 % | BODY MASS INDEX: 46.74 KG/M2

## 2019-02-03 DIAGNOSIS — Z51.89 VISIT FOR WOUND CHECK: ICD-10-CM

## 2019-02-03 NOTE — PROGRESS NOTES
Patient is a 23 year old female who presents for follow up wound check. She had an I and D for abscess on her left upper thigh. There was only a bandage in place today and the wound appeared to be completely healed with no pus or discharge today.  Wound appears to be healing very well. No induration. Patient given note to return to work tomorrow. No follow up planned at this time.  This case was discussed and reviewed with Dr Ordoñez during [employer]'s   training period.

## 2019-02-03 NOTE — LETTER
February 3, 2019         Patient: Jayson Phoenix   YOB: 1995   Date of Visit: 2/3/2019           To Whom it May Concern:    Jayson Phoenix was seen in my clinic on 2/3/2019. She can return to work 2/4/19.  Please excuse her from her recent absence.    If you have any questions or concerns, please don't hesitate to call.        Sincerely,           Taqueria Elliott P.A.-C.  Electronically Signed

## 2019-05-29 ENCOUNTER — OFFICE VISIT (OUTPATIENT)
Dept: URGENT CARE | Facility: PHYSICIAN GROUP | Age: 24
End: 2019-05-29
Payer: COMMERCIAL

## 2019-05-29 VITALS
TEMPERATURE: 98.6 F | SYSTOLIC BLOOD PRESSURE: 128 MMHG | HEART RATE: 74 BPM | WEIGHT: 252 LBS | DIASTOLIC BLOOD PRESSURE: 70 MMHG | HEIGHT: 62 IN | BODY MASS INDEX: 46.38 KG/M2 | OXYGEN SATURATION: 95 % | RESPIRATION RATE: 16 BRPM

## 2019-05-29 DIAGNOSIS — R05.9 COUGH: ICD-10-CM

## 2019-05-29 DIAGNOSIS — J20.9 ACUTE BRONCHITIS, UNSPECIFIED ORGANISM: ICD-10-CM

## 2019-05-29 DIAGNOSIS — R10.84 GENERALIZED ABDOMINAL PAIN: ICD-10-CM

## 2019-05-29 PROCEDURE — 99214 OFFICE O/P EST MOD 30 MIN: CPT | Performed by: NURSE PRACTITIONER

## 2019-05-29 PROCEDURE — 94760 N-INVAS EAR/PLS OXIMETRY 1: CPT | Performed by: NURSE PRACTITIONER

## 2019-05-29 RX ORDER — METHYLPREDNISOLONE 4 MG/1
4 TABLET ORAL DAILY
Qty: 1 KIT | Refills: 0 | Status: SHIPPED | OUTPATIENT
Start: 2019-05-29 | End: 2019-11-15

## 2019-05-29 RX ORDER — ALBUTEROL SULFATE 90 UG/1
2 AEROSOL, METERED RESPIRATORY (INHALATION) EVERY 6 HOURS PRN
Qty: 8.5 G | Refills: 0 | Status: SHIPPED | OUTPATIENT
Start: 2019-05-29 | End: 2019-11-15

## 2019-05-29 RX ORDER — BENZONATATE 100 MG/1
100 CAPSULE ORAL 3 TIMES DAILY PRN
Qty: 60 CAP | Refills: 0 | Status: SHIPPED | OUTPATIENT
Start: 2019-05-29 | End: 2019-11-15

## 2019-05-29 RX ORDER — AZITHROMYCIN 250 MG/1
TABLET, FILM COATED ORAL
Qty: 6 TAB | Refills: 0 | Status: SHIPPED | OUTPATIENT
Start: 2019-05-29 | End: 2019-11-15

## 2019-05-29 RX ORDER — IPRATROPIUM BROMIDE AND ALBUTEROL SULFATE 2.5; .5 MG/3ML; MG/3ML
3 SOLUTION RESPIRATORY (INHALATION) ONCE
Status: COMPLETED | OUTPATIENT
Start: 2019-05-29 | End: 2019-05-29

## 2019-05-29 RX ADMIN — IPRATROPIUM BROMIDE AND ALBUTEROL SULFATE 3 ML: 2.5; .5 SOLUTION RESPIRATORY (INHALATION) at 12:18

## 2019-05-29 ASSESSMENT — ENCOUNTER SYMPTOMS
HEARTBURN: 0
ABDOMINAL PAIN: 1
SINUS PAIN: 0
SORE THROAT: 0
DIZZINESS: 0
CHILLS: 1
RHINORRHEA: 1
EYE PAIN: 0
NAUSEA: 0
MYALGIAS: 1
DIARRHEA: 1
FEVER: 1
VOMITING: 0
WHEEZING: 1
HEADACHES: 0
COUGH: 1
SHORTNESS OF BREATH: 1

## 2019-05-29 NOTE — PROGRESS NOTES
Subjective:   Jayson Phoenix is a 24 y.o. female who presents for Cough (chest congestion, diarrhea X yesterday)        Cough   This is a new problem. Episode onset: 2 days. The problem has been unchanged. The problem occurs constantly. The cough is productive of sputum. Associated symptoms include chills, a fever, myalgias, nasal congestion, postnasal drip, rhinorrhea, shortness of breath and wheezing. Pertinent negatives include no chest pain, ear pain, headaches, heartburn, rash or sore throat. Nothing aggravates the symptoms. She has tried OTC cough suppressant for the symptoms. The treatment provided no relief. Her past medical history is significant for asthma and pneumonia.   URI    This is a new problem. Episode onset: 2 days. The problem has been unchanged. The maximum temperature recorded prior to her arrival was 100.4 - 100.9 F. The fever has been present for less than 1 day. Associated symptoms include abdominal pain, congestion, coughing, diarrhea, rhinorrhea and wheezing. Pertinent negatives include no chest pain, ear pain, headaches, joint swelling, nausea, plugged ear sensation, rash, sinus pain, sore throat or vomiting. She has tried acetaminophen for the symptoms. The treatment provided no relief.     Review of Systems   Constitutional: Positive for chills, fever and malaise/fatigue.   HENT: Positive for congestion, postnasal drip and rhinorrhea. Negative for ear pain, sinus pain and sore throat.    Eyes: Negative for pain.   Respiratory: Positive for cough, shortness of breath and wheezing.    Cardiovascular: Negative for chest pain.   Gastrointestinal: Positive for abdominal pain and diarrhea. Negative for heartburn, nausea and vomiting.   Genitourinary: Negative for hematuria.   Musculoskeletal: Positive for myalgias.   Skin: Negative for rash.   Neurological: Negative for dizziness and headaches.     No Known Allergies   Objective:   /70   Pulse 74   Temp 37 °C (98.6 °F)    "Resp 16   Ht 1.575 m (5' 2\")   Wt 114.3 kg (252 lb)   SpO2 95%   BMI 46.09 kg/m²   Physical Exam   Constitutional: She is oriented to person, place, and time. She appears well-developed and well-nourished. No distress.   HENT:   Head: Normocephalic and atraumatic.   Right Ear: Tympanic membrane normal.   Left Ear: Tympanic membrane normal.   Nose: Nose normal. Right sinus exhibits no maxillary sinus tenderness and no frontal sinus tenderness. Left sinus exhibits no maxillary sinus tenderness and no frontal sinus tenderness.   Mouth/Throat: Uvula is midline, oropharynx is clear and moist and mucous membranes are normal. No posterior oropharyngeal edema, posterior oropharyngeal erythema or tonsillar abscesses. No tonsillar exudate.   Eyes: Pupils are equal, round, and reactive to light. Conjunctivae and EOM are normal. Right eye exhibits no discharge. Left eye exhibits no discharge.   Cardiovascular: Normal rate and regular rhythm.    No murmur heard.  Pulmonary/Chest: Effort normal. No respiratory distress. She has no decreased breath sounds. She has wheezes. She has no rhonchi. She has no rales.   Abdominal: Soft. Bowel sounds are normal. She exhibits no distension. There is no hepatosplenomegaly. There is generalized tenderness. No hernia.   Neurological: She is alert and oriented to person, place, and time. She has normal reflexes. No sensory deficit.   Skin: Skin is warm, dry and intact.   Psychiatric: She has a normal mood and affect.         Assessment/Plan:     1. Acute bronchitis, unspecified organism  MethylPREDNISolone (MEDROL DOSEPAK) 4 MG Tablet Therapy Pack    benzonatate (TESSALON) 100 MG Cap    albuterol 108 (90 Base) MCG/ACT Aero Soln inhalation aerosol    ipratropium-albuterol (DUONEB) nebulizer solution    azithromycin (ZITHROMAX) 250 MG Tab   2. Cough  MethylPREDNISolone (MEDROL DOSEPAK) 4 MG Tablet Therapy Pack    benzonatate (TESSALON) 100 MG Cap    albuterol 108 (90 Base) MCG/ACT Aero Soln " inhalation aerosol    ipratropium-albuterol (DUONEB) nebulizer solution    azithromycin (ZITHROMAX) 250 MG Tab   3. Generalized abdominal pain       Lung sounds improved with breathing treatment. PO2 reassessed and adequate.   Advised to continue supportive care with Tylenol and/or ibuprofen for fevers and discomfort. Increased fluids and electrolytes.  Contingent antibiotic prescription given to patient to fill upon meeting criteria of guidelines discussed.   Patient given precautionary s/sx that mandate immediate follow up and evaluation in the ED. Advised of risks of not doing so.    DDX, Supportive care, and indications for immediate follow-up discussed with patient.    Instructed to return to clinic or nearest emergency department if we are not available for any change in condition, further concerns, or worsening of symptoms.    The patient demonstrated a good understanding and agreed with the treatment plan.

## 2019-05-29 NOTE — LETTER
May 29, 2019         Patient: Jayson Phoenix   YOB: 1995   Date of Visit: 5/29/2019           To Whom it May Concern:    Jayson Phoenix was seen in my clinic on 5/29/2019. She may return to work on 5/31/19.    If you have any questions or concerns, please don't hesitate to call.        Sincerely,           MICHELA Stringer.  Electronically Signed

## 2019-07-18 ENCOUNTER — APPOINTMENT (OUTPATIENT)
Dept: RADIOLOGY | Facility: MEDICAL CENTER | Age: 24
End: 2019-07-18
Attending: EMERGENCY MEDICINE
Payer: COMMERCIAL

## 2019-07-18 ENCOUNTER — HOSPITAL ENCOUNTER (EMERGENCY)
Facility: MEDICAL CENTER | Age: 24
End: 2019-07-18
Attending: EMERGENCY MEDICINE
Payer: COMMERCIAL

## 2019-07-18 VITALS
HEART RATE: 75 BPM | RESPIRATION RATE: 16 BRPM | HEIGHT: 62 IN | TEMPERATURE: 97.7 F | BODY MASS INDEX: 49.21 KG/M2 | OXYGEN SATURATION: 98 % | SYSTOLIC BLOOD PRESSURE: 158 MMHG | DIASTOLIC BLOOD PRESSURE: 67 MMHG | WEIGHT: 267.42 LBS

## 2019-07-18 DIAGNOSIS — O20.0 THREATENED MISCARRIAGE: ICD-10-CM

## 2019-07-18 DIAGNOSIS — R00.2 PALPITATIONS: ICD-10-CM

## 2019-07-18 LAB
ALBUMIN SERPL BCP-MCNC: 3.7 G/DL (ref 3.2–4.9)
ALBUMIN/GLOB SERPL: 1.3 G/DL
ALP SERPL-CCNC: 52 U/L (ref 30–99)
ALT SERPL-CCNC: 10 U/L (ref 2–50)
ANION GAP SERPL CALC-SCNC: 9 MMOL/L (ref 0–11.9)
AST SERPL-CCNC: 9 U/L (ref 12–45)
BASOPHILS # BLD AUTO: 0.3 % (ref 0–1.8)
BASOPHILS # BLD: 0.04 K/UL (ref 0–0.12)
BILIRUB SERPL-MCNC: 0.2 MG/DL (ref 0.1–1.5)
BUN SERPL-MCNC: 9 MG/DL (ref 8–22)
CALCIUM SERPL-MCNC: 8.9 MG/DL (ref 8.5–10.5)
CHLORIDE SERPL-SCNC: 104 MMOL/L (ref 96–112)
CO2 SERPL-SCNC: 21 MMOL/L (ref 20–33)
CREAT SERPL-MCNC: 0.43 MG/DL (ref 0.5–1.4)
EKG IMPRESSION: NORMAL
EOSINOPHIL # BLD AUTO: 0.19 K/UL (ref 0–0.51)
EOSINOPHIL NFR BLD: 1.3 % (ref 0–6.9)
ERYTHROCYTE [DISTWIDTH] IN BLOOD BY AUTOMATED COUNT: 47.6 FL (ref 35.9–50)
GLOBULIN SER CALC-MCNC: 2.8 G/DL (ref 1.9–3.5)
GLUCOSE SERPL-MCNC: 124 MG/DL (ref 65–99)
HCG SERPL QL: POSITIVE
HCT VFR BLD AUTO: 37.4 % (ref 37–47)
HGB BLD-MCNC: 12.4 G/DL (ref 12–16)
IMM GRANULOCYTES # BLD AUTO: 0.06 K/UL (ref 0–0.11)
IMM GRANULOCYTES NFR BLD AUTO: 0.4 % (ref 0–0.9)
LYMPHOCYTES # BLD AUTO: 3.42 K/UL (ref 1–4.8)
LYMPHOCYTES NFR BLD: 23.6 % (ref 22–41)
MCH RBC QN AUTO: 30 PG (ref 27–33)
MCHC RBC AUTO-ENTMCNC: 33.2 G/DL (ref 33.6–35)
MCV RBC AUTO: 90.6 FL (ref 81.4–97.8)
MONOCYTES # BLD AUTO: 0.63 K/UL (ref 0–0.85)
MONOCYTES NFR BLD AUTO: 4.3 % (ref 0–13.4)
NEUTROPHILS # BLD AUTO: 10.15 K/UL (ref 2–7.15)
NEUTROPHILS NFR BLD: 70.1 % (ref 44–72)
NRBC # BLD AUTO: 0 K/UL
NRBC BLD-RTO: 0 /100 WBC
PLATELET # BLD AUTO: 287 K/UL (ref 164–446)
PMV BLD AUTO: 10 FL (ref 9–12.9)
POTASSIUM SERPL-SCNC: 3.7 MMOL/L (ref 3.6–5.5)
PROT SERPL-MCNC: 6.5 G/DL (ref 6–8.2)
RBC # BLD AUTO: 4.13 M/UL (ref 4.2–5.4)
SODIUM SERPL-SCNC: 134 MMOL/L (ref 135–145)
TROPONIN T SERPL-MCNC: <6 NG/L (ref 6–19)
WBC # BLD AUTO: 14.5 K/UL (ref 4.8–10.8)

## 2019-07-18 PROCEDURE — 36415 COLL VENOUS BLD VENIPUNCTURE: CPT

## 2019-07-18 PROCEDURE — 93005 ELECTROCARDIOGRAM TRACING: CPT | Performed by: EMERGENCY MEDICINE

## 2019-07-18 PROCEDURE — 84484 ASSAY OF TROPONIN QUANT: CPT

## 2019-07-18 PROCEDURE — 76801 OB US < 14 WKS SINGLE FETUS: CPT

## 2019-07-18 PROCEDURE — 71275 CT ANGIOGRAPHY CHEST: CPT

## 2019-07-18 PROCEDURE — 93005 ELECTROCARDIOGRAM TRACING: CPT

## 2019-07-18 PROCEDURE — 85025 COMPLETE CBC W/AUTO DIFF WBC: CPT

## 2019-07-18 PROCEDURE — 99285 EMERGENCY DEPT VISIT HI MDM: CPT

## 2019-07-18 PROCEDURE — 80053 COMPREHEN METABOLIC PANEL: CPT

## 2019-07-18 PROCEDURE — 84703 CHORIONIC GONADOTROPIN ASSAY: CPT

## 2019-07-18 ASSESSMENT — LIFESTYLE VARIABLES: DO YOU DRINK ALCOHOL: NO

## 2019-07-18 NOTE — ED NOTES
"Pt stated  \"You told me I was next 1 hr ago, I don't believe anything you say anymore\". Pt was advised of triage criteria and wait times. V/S within normal limits.  "

## 2019-07-18 NOTE — ED PROVIDER NOTES
ED Provider Note    HPI: Patient is a 24-year-old female who presented to the emergency department July 18, 2019 at 11:29 AM with a chief complaint of palpitations and crampy abdominal pain.    Patient also notes episodic shortness of breath and dizziness.  The pain in the arm is described as a numbness.  She has not had a fever chills or cough.  No vomiting or diarrhea.  No vaginal bleeding.  No change in bladder bowel habits.  No other somatic complaints    Review of Systems: Positive for palpitations crampy abdominal pain episodic shortness of breath dizziness negative for fever chills cough vomiting diarrhea vaginal bleeding change in bladder bowel habits.  Review of systems reviewed with patient, all other systems negative    Past medical/surgical history: Patient believes herself to be 13 weeks pregnant elevated BMI inflammatory bowel disease reflux disease hypertension    Medications: None    Allergies: None    Social History: Positive cigarette smoking occasional use of alcohol      Physical exam: Constitutional: Pleasant female awake alert  Vital signs: Pulse 116 repeat 86 respirations 18 blood pressure 137/92 pulse oximetry 98% temperature 98.7  EYES: PERRL, EOMI, Conjunctivae and sclera normal, eyelids normal bilaterally.  Neck: Trachea midline. No cervical masses seen or palpated. Normal range of motion, supple. No meningeal signs elicited.  Cardiac: Regular rate and rhythm. S1-S2 present. No S3 or S4 present. No murmurs, rubs, or gallops heard. No edema or varicosities were seen.   Lungs: Clear to auscultation with good aeration throughout. No wheezes, rales, or rhonchi heard. Patient's chest wall moved symmetrically with each respiratory effort. Patient was not making use of accessory muscles of respiration in breathing.  Abdomen: Soft nontender to palpation. No rebound or guarding elicited.  Subjective cramping above the pubic area is not increased with palpation.  No organomegaly identified. No  pulsatile abdominal masses identified.   Musculoskeletal:  no  pain with palpitation or movement of muscle, bone or joint , no obvious musculoskeletal deformities identified.  Neurologic: alert and awake answers questions appropriately. Moves all four extremities independently, no gross focal abnormalities identified. Normal strength and motor.  Skin: no rash or lesion seen, no palpable dermatologic lesions identified.  Psychiatric: not anxious, delusional, or hallucinating.    Medical decision making: EKG obtained on arrival (interpretation) twelve-lead EKG sinus rhythm rate 99.  Morphology of P waves QRS complexes T waves unremarkable.  No evidence of ST elevation or depression.  R wave progression normal.  Interpreted as an unremarkable EKG for acute findings    Laboratory studies obtained (please see lab sheet for all results) significant findings included leukocytosis of 14.5 but no increased immature granulocytes making infectious process less likely.  Minimal hyponatremia at 134 minimal hyperglycemia 124 present.  Liver functions are normal.  Troponin test have been obtained per protocol and it was normal    Pelvic ultrasound obtained; 12-week 4-day IUP seen no abnormalities.    By the [place] pulmonary embolism scoring system the patient has a score of 5 given her documented tachycardia.  CT scan of the chest with IV contrast obtained; no evidence of pulmonary embolism or other abnormality seen    Patient will be discharged.  Given the presence of crampy pain he has a discharge diagnosis of threatened miscarriage.  She also has had some palpitations but I doubt this represents a serious acute cardiac problem although some sort of dysrhythmia is not completely ruled out.  However she had no irregular cardiac rhythm while in the department on the monitor.  Her CT study and laboratory studies are reassuring.  She will follow-up with her gynecologist and is to call tomorrow.  She is carefully counseled return to  ED immediately for any vaginal bleeding worsening palpitations chest pain or shortness of breath    Patient verbalized understanding of the above instructions and states she will comply    Pression 1) threatened miscarriage  2) palpitations

## 2019-07-18 NOTE — ED NOTES
"Pt reassesed.  Pt states no changed in symptoms.  Pt upset with staff.  This RN re explained triage process to pt.  Pt states it is unfair that others have been taken back before her.  This rn restated ed procedures for triage.  Educated pt that staff cannot discuss other pt medical conditions with her.  Pt upset with this rn.  Pt states \"I don't care, just leave.  Just come get me when I have a room.\"  "

## 2019-07-18 NOTE — ED TRIAGE NOTES
Amb to triage w/ c/o abd cramping x 4 days, pt is 13 wks pregnant, denies vag bleeding.  Pt c/o palpitations, arm pain & numbness, sob & dizziness x 2 days.  No distress noted at this time.

## 2019-07-18 NOTE — ED NOTES
"Patient yelling at this tech \" I want to go to the cafeteria to eat! I am sick of waiting\" advised patient to stay NPO.   "

## 2019-07-19 NOTE — ED NOTES
Discharge instructions given to patient, a verbal understanding of all instructions was stated. IV removed, cathlon intact, site without s/s of infection. Pt preferred to walk out accompanied by friend VSS, all belongings accounted for.

## 2019-11-14 ENCOUNTER — APPOINTMENT (OUTPATIENT)
Dept: URGENT CARE | Facility: MEDICAL CENTER | Age: 24
End: 2019-11-14
Payer: MEDICAID

## 2019-11-15 ENCOUNTER — HOSPITAL ENCOUNTER (OUTPATIENT)
Facility: MEDICAL CENTER | Age: 24
End: 2019-11-15
Attending: PHYSICIAN ASSISTANT
Payer: MEDICAID

## 2019-11-15 ENCOUNTER — OFFICE VISIT (OUTPATIENT)
Dept: URGENT CARE | Facility: CLINIC | Age: 24
End: 2019-11-15
Payer: MEDICAID

## 2019-11-15 VITALS
DIASTOLIC BLOOD PRESSURE: 84 MMHG | RESPIRATION RATE: 16 BRPM | BODY MASS INDEX: 46.38 KG/M2 | OXYGEN SATURATION: 97 % | TEMPERATURE: 97.7 F | SYSTOLIC BLOOD PRESSURE: 122 MMHG | HEIGHT: 62 IN | WEIGHT: 252 LBS | HEART RATE: 110 BPM

## 2019-11-15 DIAGNOSIS — L02.415 ABSCESS OF RIGHT THIGH: Primary | ICD-10-CM

## 2019-11-15 DIAGNOSIS — L02.415 ABSCESS OF RIGHT THIGH: ICD-10-CM

## 2019-11-15 LAB
FORWARD REASON: SPWHY: NORMAL
FORWARDED TO LAB: SPWHR: NORMAL
SPECIMEN SENT (2ND): SPWT2: NORMAL
SPECIMEN SENT (3RD): SPWT3: NORMAL
SPECIMEN SENT (4TH): SPWT4: NORMAL
SPECIMEN SENT: SPWT1: NORMAL

## 2019-11-15 PROCEDURE — 10060 I&D ABSCESS SIMPLE/SINGLE: CPT | Performed by: PHYSICIAN ASSISTANT

## 2019-11-15 RX ORDER — CLINDAMYCIN HYDROCHLORIDE 300 MG/1
CAPSULE ORAL
Refills: 1 | Status: ON HOLD | COMMUNITY
Start: 2019-11-14 | End: 2020-01-20

## 2019-11-15 NOTE — PROGRESS NOTES
Subjective:      Pt is a 24 y.o. female who presents with Sore (on leg )            HPI  This is a new problem. Pt notes abscess on right inner thigh x 7 days and states her GYN gave her clindamycin yesterday but wishes for I&D today. Pt has not taken any Rx medications for this condition. Pt states the pain is a 7/10, aching in nature and worse at night. Pt denies CP, SOB, NVD, paresthesias, headaches, dizziness, change in vision, hives, or other joint pain. The pt's medication list, problem list, and allergies have been evaluated and reviewed during today's visit.    PMH:  Past Medical History:   Diagnosis Date   • Allergy    • Asthma    • GERD (gastroesophageal reflux disease)    • Headache(784.0)    • Hypertension    • IBD (inflammatory bowel disease)    • Pregnant    • Substance abuse (HCC)     Smoker   • Urinary tract infection, site not specified        PSH:  Past Surgical History:   Procedure Laterality Date   • PRIMARY C SECTION  12/14/2017    Procedure: PRIMARY C SECTION;  Surgeon: Carlos Eduardo Burns M.D.;  Location: LABOR AND DELIVERY;  Service: Labor and Delivery   • CYSTOSCOPY STENT PLACEMENT Left 10/19/2017    Procedure: CYSTOSCOPY STENT PLACEMENT;  Surgeon: Stef Larsen M.D.;  Location: SURGERY [facility];  Service: Urology   • TONSILLECTOMY  1998       Fam Hx:    family history includes Arthritis in her maternal aunt, maternal grandfather, maternal grandmother, maternal uncle, and mother; Cancer in her maternal aunt and paternal grandfather; Diabetes in her father, paternal aunt, paternal grandmother, and paternal uncle; Genetic Disorder in her maternal grandfather; Heart Disease in her father, mother, paternal aunt, paternal grandmother, and paternal uncle; Hyperlipidemia in her father, mother, paternal aunt, paternal grandmother, and paternal uncle; Hypertension in her father, mother, paternal aunt, paternal grandmother, and paternal uncle; Other in her maternal grandfather.  Family  Status   Relation Name Status   • Mo  Alive   • Fa  Alive   • MGMo  Alive   • MGFa  Alive   • PGMo  Alive   • Sis 1 Alive   • Bro 1 Alive   • PGFa     • MAunt  Alive   • MUnc  Alive   • PAunt  Alive   • PUnc  Alive       Soc HX:  Social History     Socioeconomic History   • Marital status: Single     Spouse name: Not on file   • Number of children: Not on file   • Years of education: Not on file   • Highest education level: Not on file   Occupational History   • Not on file   Social Needs   • Financial resource strain: Not on file   • Food insecurity:     Worry: Not on file     Inability: Not on file   • Transportation needs:     Medical: Not on file     Non-medical: Not on file   Tobacco Use   • Smoking status: Never Smoker   • Smokeless tobacco: Never Used   Substance and Sexual Activity   • Alcohol use: Yes     Alcohol/week: 1.0 oz     Types: 2 Cans of beer per week     Comment: social    • Drug use: No   • Sexual activity: Yes     Partners: Male     Comment: none   Lifestyle   • Physical activity:     Days per week: Not on file     Minutes per session: Not on file   • Stress: Not on file   Relationships   • Social connections:     Talks on phone: Not on file     Gets together: Not on file     Attends Hindu service: Not on file     Active member of club or organization: Not on file     Attends meetings of clubs or organizations: Not on file     Relationship status: Not on file   • Intimate partner violence:     Fear of current or ex partner: Not on file     Emotionally abused: Not on file     Physically abused: Not on file     Forced sexual activity: Not on file   Other Topics Concern   • Not on file   Social History Narrative   • Not on file         Medications:    Current Outpatient Medications:   •  clindamycin (CLEOCIN) 300 MG Cap, TK ONE C PO  BID TAT, Disp: , Rfl: 1      Allergies:  Patient has no known allergies.    ROS  Constitutional: Negative for fever, chills and malaise/fatigue.   HENT:  "Negative for congestion and sore throat.    Eyes: Negative for blurred vision, double vision and photophobia.   Respiratory: Negative for cough and shortness of breath.  Cardiovascular: Negative for chest pain and palpitations.   Gastrointestinal: Negative for heartburn, nausea, vomiting, abdominal pain, diarrhea and constipation.   Genitourinary: Negative for dysuria and flank pain.   Musculoskeletal: Negative for joint pain and myalgias.   Skin: POS for right thigh abscess.   Neurological: Negative for dizziness, tingling and headaches.   Endo/Heme/Allergies: Does not bruise/bleed easily.   Psychiatric/Behavioral: Negative for depression. The patient is not nervous/anxious.           Objective:     /84   Pulse (!) 110   Temp 36.5 °C (97.7 °F) (Temporal)   Resp 16   Ht 1.575 m (5' 2\")   Wt 114.3 kg (252 lb)   SpO2 97%   BMI 46.09 kg/m²      Physical Exam  Skin:     General: Skin is warm.      Capillary Refill: Capillary refill takes less than 2 seconds.      Coloration: Skin is not ashen, cyanotic, jaundiced, mottled, pale or sallow.      Findings: Abscess and erythema present.      Nails: There is no clubbing.                    Constitutional: PT is oriented to person, place, and time. PT appears well-developed and well-nourished. No distress.   HENT:   Head: Normocephalic and atraumatic.   Mouth/Throat: Oropharynx is clear and moist. No oropharyngeal exudate.   Eyes: Conjunctivae normal and EOM are normal. Pupils are equal, round, and reactive to light.   Neck: Normal range of motion. Neck supple. No thyromegaly present.   Cardiovascular: Normal rate, regular rhythm, normal heart sounds and intact distal pulses.  Exam reveals no gallop and no friction rub.    No murmur heard.  Pulmonary/Chest: Effort normal and breath sounds normal. No respiratory distress. PT has no wheezes. PT has no rales. Pt exhibits no tenderness.   Abdominal: Soft. Bowel sounds are normal. PT exhibits no distension and no " mass. There is no tenderness. There is no rebound and no guarding.   Musculoskeletal: Normal range of motion. PT exhibits no edema and no tenderness.   Neurological: PT is alert and oriented to person, place, and time. PT has normal reflexes. No cranial nerve deficit.       Psychiatric: PT has a normal mood and affect. PT behavior is normal. Judgment and thought content normal.          Assessment/Plan:       1. Abscess of right thigh    I&D  Continue clindamycin given by her GYN yesterday  RICE therapy discussed  Gentle ROM exercises discussed  WBAT right LE  Ice/heat therapy discussed  OTC ibuprofen for pain control  Rest, fluids encouraged.  AVS with medical info given.  Pt was in full understanding and agreement with the plan.  Follow-up as needed if symptoms worsen or fail to improve.

## 2019-11-15 NOTE — PROCEDURES
I and D  Date/Time: 11/15/2019 12:00 PM  Performed by: Kiran Hassan P.A.-C.  Authorized by: Kiran Hassan P.A.-C.   Type: abscess  Body area: lower extremity  Location details: right leg  Anesthesia: local infiltration    Anesthesia:  Local Anesthetic: lidocaine 2% without epinephrine  Anesthetic total: 3 mL    Sedation:  Patient sedated: no    Scalpel size: 11  Needle gauge: 27.  Incision type: single straight  Incision depth: dermal  Complexity: simple  Drainage: purulent and  serosanguinous  Drainage amount: copious  Wound treatment: wound left open  Packing material: none  Patient tolerance: Patient tolerated the procedure well with no immediate complications

## 2019-11-15 NOTE — PATIENT INSTRUCTIONS
Abscess  Care After  An abscess (also called a boil or furuncle) is an infected area that contains a collection of pus. Signs and symptoms of an abscess include pain, tenderness, redness, or hardness, or you may feel a moveable soft area under your skin. An abscess can occur anywhere in the body. The infection may spread to surrounding tissues causing cellulitis. A cut (incision) by the surgeon was made over your abscess and the pus was drained out. Gauze may have been packed into the space to provide a drain that will allow the cavity to heal from the inside outwards. The boil may be painful for 5 to 7 days. Most people with a boil do not have high fevers. Your abscess, if seen early, may not have localized, and may not have been lanced. If not, another appointment may be required for this if it does not get better on its own or with medications.  HOME CARE INSTRUCTIONS   · Only take over-the-counter or prescription medicines for pain, discomfort, or fever as directed by your caregiver.  · When you bathe, soak and then remove gauze or iodoform packs at least daily or as directed by your caregiver. You may then wash the wound gently with mild soapy water. Repack with gauze or do as your caregiver directs.  SEEK IMMEDIATE MEDICAL CARE IF:   · You develop increased pain, swelling, redness, drainage, or bleeding in the wound site.  · You develop signs of generalized infection including muscle aches, chills, fever, or a general ill feeling.  · An oral temperature above 102° F (38.9° C) develops, not controlled by medication.  See your caregiver for a recheck if you develop any of the symptoms described above. If medications (antibiotics) were prescribed, take them as directed.  Document Released: 07/06/2006 Document Revised: 03/11/2013 Document Reviewed: 03/02/2009  [employer]® Patient Information ©2014 [employer].

## 2019-11-19 ENCOUNTER — TELEPHONE (OUTPATIENT)
Dept: URGENT CARE | Facility: CLINIC | Age: 24
End: 2019-11-19

## 2019-11-20 NOTE — TELEPHONE ENCOUNTER
Called and left message with pt about abscess culture results which came back positive for Staph.  Pt on clindamycin from GYN.  Encouraged Pt to call back with questions.  Kiran Hassan PA-C

## 2020-01-16 NOTE — H&P
DATE OF SCHEDULED SURGERY:  2020    HISTORY OF PRESENT ILLNESS:  The patient is a 24-year-old,  2, para   1-0-0-1 with prior C-sections, started prenatal care at 15 weeks of gestation,   has been uneventful.  Patient scheduled for repeat , here for preop   appointment.  Patient appreciates fetal movements.  Denies leaking or   bleeding.  Has occasional cramping.    REVIEW OF SYSTEMS:  Patient is awake, alert.  Denies shortness of breath,   chest pain or palpitation.  Past Surgical History:   Procedure Laterality Date   • PRIMARY C SECTION  2017    Procedure: PRIMARY C SECTION;  Surgeon: Carlos Eduardo Burns M.D.;  Location: LABOR AND DELIVERY;  Service: Labor and Delivery   • CYSTOSCOPY STENT PLACEMENT Left 10/19/2017    Procedure: CYSTOSCOPY STENT PLACEMENT;  Surgeon: Stef Larsen M.D.;  Location: SURGERY [facility];  Service: Urology   • TONSILLECTOMY       Past Medical History:   Diagnosis Date   • Allergy    • Asthma    • GERD (gastroesophageal reflux disease)    • Headache(784.0)    • Hypertension    • IBD (inflammatory bowel disease)    • Pregnant    • Substance abuse (HCC)     Smoker   • Urinary tract infection, site not specified    Patient has no known allergies.    PHYSICAL EXAMINATION:  VITAL SIGNS:  Patient is 5 feet 2 inches tall.  Patient currently weighs 269   pounds.  Total weight gain during this pregnancy has been 6 pounds.  GENERAL:  Patient is alert, oriented, well developed and well nourished.  RESPIRATION:  Patient is relaxed, breathes without effort.  Lungs are clear to   auscultate bilaterally.  HEART:  S1, S2 normal.  Regular rhythm and rate.  No added sounds.  ABDOMEN:  Soft, nontender, term gestation, relaxed, cephalic presentation   confirmed on bedside ultrasound.  PELVIC:  Fingertip, 50% effaced and -3 station.    IMPRESSION AND PLAN:  A 24-year-old  2, para 1-0-0-1, prior    section, desires repeat .  Preoperative  instructions were given.    Operative procedure was discussed in detail.  Risk inclusive of, but not   limited to infection, injury and bleeding was discussed.  Patient understands   and wants to go ahead with the procedure.             ____________________________________     MD CHRISTIAN Carcamo / KIRSTEN    DD:  01/15/2020 14:09:19  DT:  01/15/2020 15:06:00    D#:  [id number]  Job#:  [id number]

## 2020-01-20 ENCOUNTER — ANESTHESIA (OUTPATIENT)
Dept: OBGYN | Facility: MEDICAL CENTER | Age: 25
End: 2020-01-20
Payer: MEDICAID

## 2020-01-20 ENCOUNTER — HOSPITAL ENCOUNTER (INPATIENT)
Facility: MEDICAL CENTER | Age: 25
LOS: 2 days | End: 2020-01-22
Attending: OBSTETRICS & GYNECOLOGY | Admitting: OBSTETRICS & GYNECOLOGY
Payer: MEDICAID

## 2020-01-20 ENCOUNTER — ANESTHESIA EVENT (OUTPATIENT)
Dept: OBGYN | Facility: MEDICAL CENTER | Age: 25
End: 2020-01-20
Payer: MEDICAID

## 2020-01-20 DIAGNOSIS — G89.18 POST-OP PAIN: ICD-10-CM

## 2020-01-20 PROBLEM — J45.909 ASTHMA: Status: ACTIVE | Noted: 2020-01-20

## 2020-01-20 LAB
BASOPHILS # BLD AUTO: 0.2 % (ref 0–1.8)
BASOPHILS # BLD: 0.03 K/UL (ref 0–0.12)
EOSINOPHIL # BLD AUTO: 0.11 K/UL (ref 0–0.51)
EOSINOPHIL NFR BLD: 0.9 % (ref 0–6.9)
ERYTHROCYTE [DISTWIDTH] IN BLOOD BY AUTOMATED COUNT: 46.3 FL (ref 35.9–50)
HCT VFR BLD AUTO: 36.7 % (ref 37–47)
HGB BLD-MCNC: 12.4 G/DL (ref 12–16)
HOLDING TUBE BB 8507: NORMAL
IMM GRANULOCYTES # BLD AUTO: 0.04 K/UL (ref 0–0.11)
IMM GRANULOCYTES NFR BLD AUTO: 0.3 % (ref 0–0.9)
LYMPHOCYTES # BLD AUTO: 2.82 K/UL (ref 1–4.8)
LYMPHOCYTES NFR BLD: 23.4 % (ref 22–41)
MCH RBC QN AUTO: 31.6 PG (ref 27–33)
MCHC RBC AUTO-ENTMCNC: 33.8 G/DL (ref 33.6–35)
MCV RBC AUTO: 93.4 FL (ref 81.4–97.8)
MONOCYTES # BLD AUTO: 0.61 K/UL (ref 0–0.85)
MONOCYTES NFR BLD AUTO: 5.1 % (ref 0–13.4)
NEUTROPHILS # BLD AUTO: 8.46 K/UL (ref 2–7.15)
NEUTROPHILS NFR BLD: 70.1 % (ref 44–72)
NRBC # BLD AUTO: 0 K/UL
NRBC BLD-RTO: 0 /100 WBC
PLATELET # BLD AUTO: 264 K/UL (ref 164–446)
PMV BLD AUTO: 10.2 FL (ref 9–12.9)
RBC # BLD AUTO: 3.93 M/UL (ref 4.2–5.4)
WBC # BLD AUTO: 12.1 K/UL (ref 4.8–10.8)

## 2020-01-20 PROCEDURE — 700102 HCHG RX REV CODE 250 W/ 637 OVERRIDE(OP): Performed by: ANESTHESIOLOGY

## 2020-01-20 PROCEDURE — 36415 COLL VENOUS BLD VENIPUNCTURE: CPT

## 2020-01-20 PROCEDURE — A9270 NON-COVERED ITEM OR SERVICE: HCPCS | Performed by: ANESTHESIOLOGY

## 2020-01-20 PROCEDURE — 306828 HCHG ANES-TIME GENERAL: Performed by: OBSTETRICS & GYNECOLOGY

## 2020-01-20 PROCEDURE — 700101 HCHG RX REV CODE 250: Performed by: ANESTHESIOLOGY

## 2020-01-20 PROCEDURE — 770002 HCHG ROOM/CARE - OB PRIVATE (112)

## 2020-01-20 PROCEDURE — 59514 CESAREAN DELIVERY ONLY: CPT

## 2020-01-20 PROCEDURE — A9270 NON-COVERED ITEM OR SERVICE: HCPCS | Performed by: OBSTETRICS & GYNECOLOGY

## 2020-01-20 PROCEDURE — 700105 HCHG RX REV CODE 258: Performed by: ANESTHESIOLOGY

## 2020-01-20 PROCEDURE — 85025 COMPLETE CBC W/AUTO DIFF WBC: CPT

## 2020-01-20 PROCEDURE — 700111 HCHG RX REV CODE 636 W/ 250 OVERRIDE (IP)

## 2020-01-20 PROCEDURE — 700111 HCHG RX REV CODE 636 W/ 250 OVERRIDE (IP): Performed by: ANESTHESIOLOGY

## 2020-01-20 PROCEDURE — 306288 HCHG RETRACTOR C SECTION LG

## 2020-01-20 PROCEDURE — A6212 FOAM DRG <=16 SQ IN W/BORDER: HCPCS

## 2020-01-20 PROCEDURE — 305385 HCHG SURGICAL SERVICES 1/4 HOUR: Performed by: OBSTETRICS & GYNECOLOGY

## 2020-01-20 PROCEDURE — 304964 HCHG RECOVERY ROOM TIME 1HR: Performed by: OBSTETRICS & GYNECOLOGY

## 2020-01-20 PROCEDURE — 700112 HCHG RX REV CODE 229: Performed by: OBSTETRICS & GYNECOLOGY

## 2020-01-20 RX ORDER — CEFAZOLIN SODIUM 1 G/3ML
INJECTION, POWDER, FOR SOLUTION INTRAMUSCULAR; INTRAVENOUS PRN
Status: DISCONTINUED | OUTPATIENT
Start: 2020-01-20 | End: 2020-01-20 | Stop reason: SURG

## 2020-01-20 RX ORDER — KETOROLAC TROMETHAMINE 30 MG/ML
30 INJECTION, SOLUTION INTRAMUSCULAR; INTRAVENOUS EVERY 6 HOURS
Status: COMPLETED | OUTPATIENT
Start: 2020-01-20 | End: 2020-01-21

## 2020-01-20 RX ORDER — KETOROLAC TROMETHAMINE 30 MG/ML
INJECTION, SOLUTION INTRAMUSCULAR; INTRAVENOUS PRN
Status: DISCONTINUED | OUTPATIENT
Start: 2020-01-20 | End: 2020-01-20 | Stop reason: SURG

## 2020-01-20 RX ORDER — OXYCODONE HCL 5 MG/5 ML
10 SOLUTION, ORAL ORAL
Status: DISCONTINUED | OUTPATIENT
Start: 2020-01-20 | End: 2020-01-20 | Stop reason: HOSPADM

## 2020-01-20 RX ORDER — DIPHENHYDRAMINE HYDROCHLORIDE 50 MG/ML
12.5 INJECTION INTRAMUSCULAR; INTRAVENOUS
Status: DISCONTINUED | OUTPATIENT
Start: 2020-01-20 | End: 2020-01-20 | Stop reason: HOSPADM

## 2020-01-20 RX ORDER — HYDROMORPHONE HYDROCHLORIDE 1 MG/ML
0.2 INJECTION, SOLUTION INTRAMUSCULAR; INTRAVENOUS; SUBCUTANEOUS
Status: ACTIVE | OUTPATIENT
Start: 2020-01-20 | End: 2020-01-21

## 2020-01-20 RX ORDER — SODIUM CHLORIDE, SODIUM GLUCONATE, SODIUM ACETATE, POTASSIUM CHLORIDE AND MAGNESIUM CHLORIDE 526; 502; 368; 37; 30 MG/100ML; MG/100ML; MG/100ML; MG/100ML; MG/100ML
1500 INJECTION, SOLUTION INTRAVENOUS ONCE
Status: COMPLETED | OUTPATIENT
Start: 2020-01-20 | End: 2020-01-20

## 2020-01-20 RX ORDER — SODIUM CHLORIDE, SODIUM LACTATE, POTASSIUM CHLORIDE, CALCIUM CHLORIDE 600; 310; 30; 20 MG/100ML; MG/100ML; MG/100ML; MG/100ML
INJECTION, SOLUTION INTRAVENOUS CONTINUOUS
Status: DISCONTINUED | OUTPATIENT
Start: 2020-01-20 | End: 2020-01-22 | Stop reason: HOSPADM

## 2020-01-20 RX ORDER — MISOPROSTOL 200 UG/1
600 TABLET ORAL
Status: DISCONTINUED | OUTPATIENT
Start: 2020-01-20 | End: 2020-01-22 | Stop reason: HOSPADM

## 2020-01-20 RX ORDER — HYDROMORPHONE HYDROCHLORIDE 1 MG/ML
0.1 INJECTION, SOLUTION INTRAMUSCULAR; INTRAVENOUS; SUBCUTANEOUS
Status: DISCONTINUED | OUTPATIENT
Start: 2020-01-20 | End: 2020-01-20 | Stop reason: HOSPADM

## 2020-01-20 RX ORDER — OXYCODONE HCL 5 MG/5 ML
5 SOLUTION, ORAL ORAL
Status: DISCONTINUED | OUTPATIENT
Start: 2020-01-20 | End: 2020-01-20 | Stop reason: HOSPADM

## 2020-01-20 RX ORDER — HYDRALAZINE HYDROCHLORIDE 20 MG/ML
5 INJECTION INTRAMUSCULAR; INTRAVENOUS
Status: DISCONTINUED | OUTPATIENT
Start: 2020-01-20 | End: 2020-01-20 | Stop reason: HOSPADM

## 2020-01-20 RX ORDER — CITRIC ACID/SODIUM CITRATE 334-500MG
30 SOLUTION, ORAL ORAL ONCE
Status: COMPLETED | OUTPATIENT
Start: 2020-01-20 | End: 2020-01-20

## 2020-01-20 RX ORDER — MORPHINE SULFATE 2 MG/ML
INJECTION, SOLUTION INTRAMUSCULAR; INTRAVENOUS
Status: COMPLETED | OUTPATIENT
Start: 2020-01-20 | End: 2020-01-20

## 2020-01-20 RX ORDER — ONDANSETRON 2 MG/ML
INJECTION INTRAMUSCULAR; INTRAVENOUS PRN
Status: DISCONTINUED | OUTPATIENT
Start: 2020-01-20 | End: 2020-01-20 | Stop reason: SURG

## 2020-01-20 RX ORDER — DIPHENHYDRAMINE HYDROCHLORIDE 50 MG/ML
12.5 INJECTION INTRAMUSCULAR; INTRAVENOUS EVERY 6 HOURS PRN
Status: ACTIVE | OUTPATIENT
Start: 2020-01-20 | End: 2020-01-21

## 2020-01-20 RX ORDER — DEXAMETHASONE SODIUM PHOSPHATE 4 MG/ML
INJECTION, SOLUTION INTRA-ARTICULAR; INTRALESIONAL; INTRAMUSCULAR; INTRAVENOUS; SOFT TISSUE PRN
Status: DISCONTINUED | OUTPATIENT
Start: 2020-01-20 | End: 2020-01-20 | Stop reason: SURG

## 2020-01-20 RX ORDER — HYDROMORPHONE HYDROCHLORIDE 1 MG/ML
0.2 INJECTION, SOLUTION INTRAMUSCULAR; INTRAVENOUS; SUBCUTANEOUS
Status: DISCONTINUED | OUTPATIENT
Start: 2020-01-20 | End: 2020-01-20 | Stop reason: HOSPADM

## 2020-01-20 RX ORDER — OXYTOCIN 10 [USP'U]/ML
INJECTION, SOLUTION INTRAMUSCULAR; INTRAVENOUS PRN
Status: DISCONTINUED | OUTPATIENT
Start: 2020-01-20 | End: 2020-01-20 | Stop reason: SURG

## 2020-01-20 RX ORDER — SODIUM CHLORIDE, SODIUM LACTATE, POTASSIUM CHLORIDE, CALCIUM CHLORIDE 600; 310; 30; 20 MG/100ML; MG/100ML; MG/100ML; MG/100ML
INJECTION, SOLUTION INTRAVENOUS PRN
Status: DISCONTINUED | OUTPATIENT
Start: 2020-01-20 | End: 2020-01-22 | Stop reason: HOSPADM

## 2020-01-20 RX ORDER — ONDANSETRON 2 MG/ML
4 INJECTION INTRAMUSCULAR; INTRAVENOUS EVERY 6 HOURS PRN
Status: ACTIVE | OUTPATIENT
Start: 2020-01-20 | End: 2020-01-21

## 2020-01-20 RX ORDER — LABETALOL HYDROCHLORIDE 5 MG/ML
5 INJECTION, SOLUTION INTRAVENOUS
Status: DISCONTINUED | OUTPATIENT
Start: 2020-01-20 | End: 2020-01-20 | Stop reason: HOSPADM

## 2020-01-20 RX ORDER — METOCLOPRAMIDE HYDROCHLORIDE 5 MG/ML
10 INJECTION INTRAMUSCULAR; INTRAVENOUS ONCE
Status: COMPLETED | OUTPATIENT
Start: 2020-01-20 | End: 2020-01-20

## 2020-01-20 RX ORDER — HYDROMORPHONE HYDROCHLORIDE 1 MG/ML
0.4 INJECTION, SOLUTION INTRAMUSCULAR; INTRAVENOUS; SUBCUTANEOUS
Status: DISCONTINUED | OUTPATIENT
Start: 2020-01-20 | End: 2020-01-20 | Stop reason: HOSPADM

## 2020-01-20 RX ORDER — BUPIVACAINE HYDROCHLORIDE 7.5 MG/ML
INJECTION, SOLUTION INTRASPINAL
Status: COMPLETED | OUTPATIENT
Start: 2020-01-20 | End: 2020-01-20

## 2020-01-20 RX ORDER — SODIUM CHLORIDE, SODIUM GLUCONATE, SODIUM ACETATE, POTASSIUM CHLORIDE AND MAGNESIUM CHLORIDE 526; 502; 368; 37; 30 MG/100ML; MG/100ML; MG/100ML; MG/100ML; MG/100ML
INJECTION, SOLUTION INTRAVENOUS
Status: DISCONTINUED | OUTPATIENT
Start: 2020-01-20 | End: 2020-01-20 | Stop reason: SURG

## 2020-01-20 RX ORDER — VITAMIN A ACETATE, BETA CAROTENE, ASCORBIC ACID, CHOLECALCIFEROL, .ALPHA.-TOCOPHEROL ACETATE, DL-, THIAMINE MONONITRATE, RIBOFLAVIN, NIACINAMIDE, PYRIDOXINE HYDROCHLORIDE, FOLIC ACID, CYANOCOBALAMIN, CALCIUM CARBONATE, FERROUS FUMARATE, ZINC OXIDE, CUPRIC OXIDE 3080; 12; 120; 400; 1; 1.84; 3; 20; 22; 920; 25; 200; 27; 10; 2 [IU]/1; UG/1; MG/1; [IU]/1; MG/1; MG/1; MG/1; MG/1; MG/1; [IU]/1; MG/1; MG/1; MG/1; MG/1; MG/1
1 TABLET, FILM COATED ORAL EVERY MORNING
Status: DISCONTINUED | OUTPATIENT
Start: 2020-01-20 | End: 2020-01-22 | Stop reason: HOSPADM

## 2020-01-20 RX ORDER — OXYCODONE HYDROCHLORIDE 5 MG/1
5 TABLET ORAL EVERY 4 HOURS PRN
Status: DISPENSED | OUTPATIENT
Start: 2020-01-20 | End: 2020-01-21

## 2020-01-20 RX ORDER — OXYCODONE HYDROCHLORIDE 10 MG/1
10 TABLET ORAL EVERY 4 HOURS PRN
Status: DISPENSED | OUTPATIENT
Start: 2020-01-20 | End: 2020-01-21

## 2020-01-20 RX ORDER — PHENYLEPHRINE HYDROCHLORIDE 10 MG/ML
INJECTION, SOLUTION INTRAMUSCULAR; INTRAVENOUS; SUBCUTANEOUS PRN
Status: DISCONTINUED | OUTPATIENT
Start: 2020-01-20 | End: 2020-01-20 | Stop reason: SURG

## 2020-01-20 RX ORDER — HALOPERIDOL 5 MG/ML
1 INJECTION INTRAMUSCULAR
Status: DISCONTINUED | OUTPATIENT
Start: 2020-01-20 | End: 2020-01-20 | Stop reason: HOSPADM

## 2020-01-20 RX ORDER — ACETAMINOPHEN 500 MG
1000 TABLET ORAL EVERY 6 HOURS
Status: COMPLETED | OUTPATIENT
Start: 2020-01-20 | End: 2020-01-21

## 2020-01-20 RX ORDER — SODIUM CHLORIDE, SODIUM GLUCONATE, SODIUM ACETATE, POTASSIUM CHLORIDE AND MAGNESIUM CHLORIDE 526; 502; 368; 37; 30 MG/100ML; MG/100ML; MG/100ML; MG/100ML; MG/100ML
500 INJECTION, SOLUTION INTRAVENOUS CONTINUOUS
Status: DISCONTINUED | OUTPATIENT
Start: 2020-01-20 | End: 2020-01-22 | Stop reason: HOSPADM

## 2020-01-20 RX ORDER — DOCUSATE SODIUM 100 MG/1
100 CAPSULE, LIQUID FILLED ORAL 2 TIMES DAILY PRN
Status: DISCONTINUED | OUTPATIENT
Start: 2020-01-20 | End: 2020-01-22 | Stop reason: HOSPADM

## 2020-01-20 RX ADMIN — SODIUM CHLORIDE, SODIUM GLUCONATE, SODIUM ACETATE, POTASSIUM CHLORIDE AND MAGNESIUM CHLORIDE 1000 ML: 526; 502; 368; 37; 30 INJECTION, SOLUTION INTRAVENOUS at 11:15

## 2020-01-20 RX ADMIN — FENTANYL CITRATE 15 MCG: 50 INJECTION, SOLUTION INTRAMUSCULAR; INTRAVENOUS at 12:10

## 2020-01-20 RX ADMIN — OXYCODONE HYDROCHLORIDE 5 MG: 5 TABLET ORAL at 19:31

## 2020-01-20 RX ADMIN — KETOROLAC TROMETHAMINE 30 MG: 30 INJECTION, SOLUTION INTRAMUSCULAR at 12:51

## 2020-01-20 RX ADMIN — PHENYLEPHRINE HYDROCHLORIDE 100 MCG: 10 INJECTION INTRAVENOUS at 12:45

## 2020-01-20 RX ADMIN — SODIUM CHLORIDE, SODIUM GLUCONATE, SODIUM ACETATE, POTASSIUM CHLORIDE AND MAGNESIUM CHLORIDE: 526; 502; 368; 37; 30 INJECTION, SOLUTION INTRAVENOUS at 12:42

## 2020-01-20 RX ADMIN — BUPIVACAINE HYDROCHLORIDE IN DEXTROSE 1.6 ML: 7.5 INJECTION, SOLUTION SUBARACHNOID at 12:10

## 2020-01-20 RX ADMIN — OXYCODONE HYDROCHLORIDE 5 MG: 5 TABLET ORAL at 23:34

## 2020-01-20 RX ADMIN — DOCUSATE SODIUM 100 MG: 100 CAPSULE, LIQUID FILLED ORAL at 19:31

## 2020-01-20 RX ADMIN — PHENYLEPHRINE HYDROCHLORIDE 100 MCG: 10 INJECTION INTRAVENOUS at 12:58

## 2020-01-20 RX ADMIN — FAMOTIDINE 20 MG: 10 INJECTION INTRAVENOUS at 11:41

## 2020-01-20 RX ADMIN — PHENYLEPHRINE HYDROCHLORIDE 100 MCG: 10 INJECTION INTRAVENOUS at 12:35

## 2020-01-20 RX ADMIN — OXYTOCIN 20 UNITS: 10 INJECTION, SOLUTION INTRAMUSCULAR; INTRAVENOUS at 12:42

## 2020-01-20 RX ADMIN — CEFAZOLIN 2 G: 330 INJECTION, POWDER, FOR SOLUTION INTRAMUSCULAR; INTRAVENOUS at 12:22

## 2020-01-20 RX ADMIN — METOCLOPRAMIDE 10 MG: 5 INJECTION, SOLUTION INTRAMUSCULAR; INTRAVENOUS at 11:41

## 2020-01-20 RX ADMIN — KETOROLAC TROMETHAMINE 30 MG: 30 INJECTION, SOLUTION INTRAMUSCULAR at 18:38

## 2020-01-20 RX ADMIN — PHENYLEPHRINE HYDROCHLORIDE 100 MCG: 10 INJECTION INTRAVENOUS at 12:37

## 2020-01-20 RX ADMIN — Medication 20 UNITS: at 13:55

## 2020-01-20 RX ADMIN — ONDANSETRON 8 MG: 2 INJECTION INTRAMUSCULAR; INTRAVENOUS at 12:42

## 2020-01-20 RX ADMIN — DEXAMETHASONE SODIUM PHOSPHATE 10 MG: 4 INJECTION, SOLUTION INTRA-ARTICULAR; INTRALESIONAL; INTRAMUSCULAR; INTRAVENOUS; SOFT TISSUE at 12:42

## 2020-01-20 RX ADMIN — SODIUM CITRATE AND CITRIC ACID MONOHYDRATE 30 ML: 500; 334 SOLUTION ORAL at 11:41

## 2020-01-20 RX ADMIN — PHENYLEPHRINE HYDROCHLORIDE 100 MCG: 10 INJECTION INTRAVENOUS at 12:30

## 2020-01-20 RX ADMIN — ACETAMINOPHEN 1000 MG: 500 TABLET, FILM COATED ORAL at 18:38

## 2020-01-20 RX ADMIN — PHENYLEPHRINE HYDROCHLORIDE 100 MCG: 10 INJECTION INTRAVENOUS at 13:03

## 2020-01-20 RX ADMIN — MORPHINE SULFATE 0.1 MG: 2 INJECTION, SOLUTION INTRAMUSCULAR; INTRAVENOUS at 12:10

## 2020-01-20 RX ADMIN — PHENYLEPHRINE HYDROCHLORIDE 100 MCG: 10 INJECTION INTRAVENOUS at 12:53

## 2020-01-20 RX ADMIN — PHENYLEPHRINE HYDROCHLORIDE 100 MCG: 10 INJECTION INTRAVENOUS at 12:49

## 2020-01-20 RX ADMIN — SODIUM CHLORIDE, SODIUM GLUCONATE, SODIUM ACETATE, POTASSIUM CHLORIDE AND MAGNESIUM CHLORIDE: 526; 502; 368; 37; 30 INJECTION, SOLUTION INTRAVENOUS at 11:46

## 2020-01-20 RX ADMIN — PHENYLEPHRINE HYDROCHLORIDE 200 MCG: 10 INJECTION INTRAVENOUS at 12:43

## 2020-01-20 SDOH — ECONOMIC STABILITY: FOOD INSECURITY: WITHIN THE PAST 12 MONTHS, THE FOOD YOU BOUGHT JUST DIDN'T LAST AND YOU DIDN'T HAVE MONEY TO GET MORE.: SOMETIMES TRUE

## 2020-01-20 SDOH — ECONOMIC STABILITY: FOOD INSECURITY: WITHIN THE PAST 12 MONTHS, YOU WORRIED THAT YOUR FOOD WOULD RUN OUT BEFORE YOU GOT MONEY TO BUY MORE.: SOMETIMES TRUE

## 2020-01-20 SDOH — ECONOMIC STABILITY: TRANSPORTATION INSECURITY
IN THE PAST 12 MONTHS, HAS THE LACK OF TRANSPORTATION KEPT YOU FROM MEDICAL APPOINTMENTS OR FROM GETTING MEDICATIONS?: NO

## 2020-01-20 SDOH — HEALTH STABILITY: MENTAL HEALTH: HOW OFTEN DO YOU HAVE A DRINK CONTAINING ALCOHOL?: NEVER

## 2020-01-20 SDOH — ECONOMIC STABILITY: TRANSPORTATION INSECURITY
IN THE PAST 12 MONTHS, HAS LACK OF TRANSPORTATION KEPT YOU FROM MEETINGS, WORK, OR FROM GETTING THINGS NEEDED FOR DAILY LIVING?: NO

## 2020-01-20 ASSESSMENT — LIFESTYLE VARIABLES
HAVE PEOPLE ANNOYED YOU BY CRITICIZING YOUR DRINKING: NO
TOTAL SCORE: 0
EVER HAD A DRINK FIRST THING IN THE MORNING TO STEADY YOUR NERVES TO GET RID OF A HANGOVER: NO
ALCOHOL_USE: NO
EVER FELT BAD OR GUILTY ABOUT YOUR DRINKING: NO
HAVE YOU EVER FELT YOU SHOULD CUT DOWN ON YOUR DRINKING: NO
CONSUMPTION TOTAL: INCOMPLETE
EVER_SMOKED: NEVER
TOTAL SCORE: 0
TOTAL SCORE: 0

## 2020-01-20 ASSESSMENT — EDINBURGH POSTNATAL DEPRESSION SCALE (EPDS)
I HAVE FELT SCARED OR PANICKY FOR NO GOOD REASON: NO, NOT MUCH
I HAVE BEEN ABLE TO LAUGH AND SEE THE FUNNY SIDE OF THINGS: AS MUCH AS I ALWAYS COULD
THINGS HAVE BEEN GETTING ON TOP OF ME: NO, MOST OF THE TIME I HAVE COPED QUITE WELL
I HAVE FELT SAD OR MISERABLE: NOT VERY OFTEN
THE THOUGHT OF HARMING MYSELF HAS OCCURRED TO ME: NEVER
I HAVE LOOKED FORWARD WITH ENJOYMENT TO THINGS: AS MUCH AS I EVER DID
I HAVE BEEN ANXIOUS OR WORRIED FOR NO GOOD REASON: YES, SOMETIMES
I HAVE BEEN SO UNHAPPY THAT I HAVE BEEN CRYING: ONLY OCCASIONALLY
I HAVE BLAMED MYSELF UNNECESSARILY WHEN THINGS WENT WRONG: YES, SOME OF THE TIME
I HAVE BEEN SO UNHAPPY THAT I HAVE HAD DIFFICULTY SLEEPING: NOT VERY OFTEN

## 2020-01-20 ASSESSMENT — COPD QUESTIONNAIRES
DURING THE PAST 4 WEEKS HOW MUCH DID YOU FEEL SHORT OF BREATH: NONE/LITTLE OF THE TIME
COPD SCREENING SCORE: 2
DO YOU EVER COUGH UP ANY MUCUS OR PHLEGM?: NO/ONLY WITH OCCASIONAL COLDS OR INFECTIONS
HAVE YOU SMOKED AT LEAST 100 CIGARETTES IN YOUR ENTIRE LIFE: YES
IN THE PAST 12 MONTHS DO YOU DO LESS THAN YOU USED TO BECAUSE OF YOUR BREATHING PROBLEMS: DISAGREE/UNSURE

## 2020-01-20 ASSESSMENT — PATIENT HEALTH QUESTIONNAIRE - PHQ9
1. LITTLE INTEREST OR PLEASURE IN DOING THINGS: NOT AT ALL
SUM OF ALL RESPONSES TO PHQ9 QUESTIONS 1 AND 2: 0
2. FEELING DOWN, DEPRESSED, IRRITABLE, OR HOPELESS: NOT AT ALL

## 2020-01-20 NOTE — ANESTHESIA PREPROCEDURE EVALUATION
25yo  at term, here for repeat C section    Had epidural for last baby and had preE so went to section; no issues with epidural placement or function  Relevant Problems   PULMONARY   (+) Asthma      CARDIAC   (+) Antepartum mild preeclampsia, third trimester      OB   (+) Antepartum mild preeclampsia, third trimester       Physical Exam    Airway   Mallampati: II  TM distance: >3 FB  Neck ROM: full       Cardiovascular - normal exam  Rhythm: regular  Rate: normal  (-) murmur     Dental - normal exam         Pulmonary - normal exam  Breath sounds clear to auscultation     Abdominal    Neurological - normal exam                 Anesthesia Plan    ASA 2       Plan - spinal   Neuraxial block will be primary anesthetic            Postoperative Plan: Postoperative administration of opioids is intended.    Pertinent diagnostic labs and testing reviewed    Informed Consent:    Anesthetic plan and risks discussed with patient.

## 2020-01-20 NOTE — ANESTHESIA QCDR
2019 [facility] Clinical Data Registry (for Quality Improvement)     Postoperative nausea/vomiting risk protocol (Adult = 18 yrs and Pediatric 3-17 yrs)- (430 and 463)  General inhalation anesthetic (NOT TIVA) with PONV risk factors: No  Provision of anti-emetic therapy with at least 2 different classes of agents: N/A  Patient DID NOT receive anti-emetic therapy and reason is documented in Medical Record: N/A    Multimodal Pain Management- (477)  Non-emergent surgery AND patient age >= 18: Yes  Use of Multimodal Pain Management, two or more drugs and/or interventions, NOT including systemic opioids: Yes  Exception: Documented allergy to multiple classes of analgesics: N/A    Smoking Abstinence (404)  Patient is current smoker (cigarette, pipe, e-cig, marijuanna): No  Elective Surgery:   Abstinence instructions provided prior to day of surgery:   Patient abstained from smoking on day of surgery:     Pre-Op Beta-Blocker in Isolated CABG (44)  Isolated CABG AND patient age >= 18: No  Beta-blocker admin within 24 hours of surgical incision:   Exception:of medical reason(s) for not administering beta blocker within 24 hours prior to surgical incision (e.g., not  indicated,other medical reason):     PACU assessment of acute postoperative pain prior to Anesthesia Care End- Applies to Patients Age = 18- (ABG7)  Initial PACU pain score is which of the following: < 7/10  Patient unable to report pain score: N/A    Post-anesthetic transfer of care checklist/protocol to PACU/ICU- (426 and 427)  Upon conclusion of case, patient transferred to which of the following locations: PACU/Non-ICU  Use of transfer checklist/protocol: Yes  Exclusion: Service Performed in Patient Hospital Room (and thus did not require transfer): N/A  Unplanned admission to ICU related to anesthesia service up through end of PACU care- (MD51)  Unplanned admission to ICU (not initially anticipated at anesthesia start time): No

## 2020-01-20 NOTE — ANESTHESIA PROCEDURE NOTES
Spinal Block  Date/Time: 1/20/2020 12:10 PM  Performed by: Shy Quezada M.D.  Authorized by: Shy Quezada M.D.     Patient Location:  OR  Start Time:  1/20/2020 12:10 PM  End Time:  1/20/2020 12:21 PM  Reason for Block: primary anesthetic    patient identified, IV checked, site marked, risks and benefits discussed, surgical consent, monitors and equipment checked, pre-op evaluation and timeout performed    Patient Position:  Sitting  Prep: ChloraPrep, patient draped and sterile technique    Monitoring:  Blood pressure, continuous pulse oximetry and heart rate  Approach:  Midline  Location:  L4-5  Injection Technique:  Single-shot  Skin infiltration:  Lidocaine  Strength:  1%  Dose:  5ml  Needle Type:  Pencan  Needle Gauge:  25 G  CSF flowing pre/post injection:  Yes  Sensory Level:  T4   Spinal placed 1st attempt by MSIII

## 2020-01-20 NOTE — OP REPORT
DATE OF SERVICE:  2020    INDICATIONS:  The patient is a 24-year-old  2, para 1-0-0-1 with prior    at 39 weeks gestational age, not in labor, here for elective repeat   .    PREOPERATIVE DIAGNOSES:  Term gestation, not in labor, with prior    section.    POSTOPERATIVE DIAGNOSES:  Term gestation, not in labor, with prior     section.    PROCEDURE PERFORMED:  Repeat low transverse  section.    ANESTHESIA:  Spinal.    ANESTHESIOLOGIST:  Shy Quezada MD    SURGEON:  Fabio Callahan MD    ASSISTANT:  Kacey Shea MD    ESTIMATED BLOOD LOSS:  700 mL    DRAINS:  Aceves drained 400 mL of clear urine at the end of the procedure.    COMPLICATIONS:  None.    FINDINGS:  Male infant in cephalic presentation.  Weights are pending.  Apgars   were 8 at 1 minute and 9 at 5 minutes.  Normal uterus, tubes, and ovaries   were noted.    DESCRIPTION OF PROCEDURE:  The patient was taken to the operating room where   spinal anesthesia was placed and found to be adequate.  Bladder was Aceves   catheterized.  The patient was prepped and draped in the usual sterile fashion   in the dorsal supine position with a leftward tilt.  A Pfannenstiel skin   incision was made with the scalpel and carried through to the underlying layer   of fascia using the Bovie.  Fascia was incised in the midline and extended   laterally using Ibarra scissors.  Kocher clamps was used to elevate the inferior   aspect of the fascial incision, which was elevated and the underlying rectus   muscles were dissected off bluntly and using Ibarra scissors.  Attention was   then turned to the superior aspect of the fascial incision, which in a similar   fashion was grasped with Kocher clamps, elevated and the underlying rectus   muscles were dissected off bluntly and using Ibarra scissors.  Rectus muscles   were  in the midline.  Peritoneum was identified, held up with   pickups and sharp dissection using  Metzenbaum scissors.  The peritoneal cavity   was entered.  Incision was extended superiorly and inferiorly with good   visualization of the bladder.  Tal retractor was introduced and held in   place.  Vesicouterine peritoneum was identified, held up with pickups and   entered sharply using Metzenbaum scissors.  Incision was extended laterally by   sharp and digital dissection and the bladder flap was created.  Lower uterine   segment was incised in a transverse fashion using the scalpel and extended   using manual traction.  Clear fluid was noted.  Infant was subsequently   delivered atraumatically.  Nose and mouth were bulb suctioned.  Cord was   clamped and cut.  Infant was subsequently handed to the awaiting nursery   nurse.  Subsequently, placenta was removed spontaneously intact with a   3-vessel cord noted.  Uterus was exteriorized and cleared of all clots and   debris.  Uterine incision was repaired in 2 layers using 0 chromic suture.    Hemostasis was visualized.  The uterus was returned to the abdomen.  No   bleeding was noted from the right angle.  Hemostatic figure-of-eight sutures   were placed for reinforcement and after reaffirming hemostasis, pelvic cavity   was copiously irrigated and fluid was suctioned out.  After reaffirming   hemostasis, peritoneum was closed with 3-0 Vicryl.  Rectus muscles were   approximated in the midline.  Subcutaneous layer was closed with 3-0 plain gut   and the skin was closed with staples.  Sponge, lap, and instrument counts   were correct x3.  The patient was stable at the completion of the procedure   and transferred to the recovery room in stable condition.       ____________________________________     MD CHRISTIAN Carcamo / KIRSTEN    DD:  01/20/2020 13:50:17  DT:  01/20/2020 14:00:14    D#:  [id number]  Job#:  [id number]

## 2020-01-20 NOTE — OR SURGEON
Immediate Post OP Note    PreOp Diagnosis: term   prior c/s.  Not in labor.  PostOp Diagnosis: same.    Procedure(s):   SECTION, REPEAT - Wound Class: Contaminated    Surgeon(s):  ANKIT Parson M.D.    Anesthesiologist/Type of Anesthesia:  Anesthesiologist: Shy Quezada M.D./Spinal    Surgical Staff:  Circulator: Omayra Mcgowan R.N.  Scrub Person: Dotty Andrea  L&MANDIE Baby  Nurse: Yisel Lara R.N.    Specimens removed if any:  * No specimens in log *    Estimated Blood Loss: 700ml.    Findings: normal uterus tubes and ovaries.    Complications: none.        2020 1:45 PM Fabio Callahan M.D.

## 2020-01-20 NOTE — PROGRESS NOTES
1015: Pt presents to L&D for scheduled repeat c/s. Pt is a  at 39.4 weeks gestation. POC discussed, pt and FOB Naveen verbalized understanding.  1209: Pt ambulated to OR2  1241: Viable male per Dr. Callahan, baby assigned 8/9 APGARS  1307: Pt to PACU in stable condition  1407: Pt and baby transferred to postpartum via gurney. Report given to Haydee Dent RN. Pt in stable condition with a firm fundus and light lochia. ID bands verified

## 2020-01-20 NOTE — ANESTHESIA TIME REPORT
Anesthesia Start and Stop Event Times     Date Time Event    2020 1140 Ready for Procedure     1209 Anesthesia Start     1309 Anesthesia Stop        Responsible Staff  20    Name Role Begin End    Shy Quezada M.D. Anesth 1209 1309        Preop Diagnosis (Free Text):  Pre-op Diagnosis     CEASAREAN SECTION REPEAT, 39 WEEKS        Preop Diagnosis (Codes):  Diagnosis Information     Diagnosis Code(s): Status post repeat low transverse  section [Z98.891]        Post op Diagnosis  History of  section      Premium Reason  Non-Premium    Comments:

## 2020-01-21 LAB
ERYTHROCYTE [DISTWIDTH] IN BLOOD BY AUTOMATED COUNT: 45.9 FL (ref 35.9–50)
HCT VFR BLD AUTO: 30.5 % (ref 37–47)
HGB BLD-MCNC: 10.3 G/DL (ref 12–16)
MCH RBC QN AUTO: 32.3 PG (ref 27–33)
MCHC RBC AUTO-ENTMCNC: 33.8 G/DL (ref 33.6–35)
MCV RBC AUTO: 95.6 FL (ref 81.4–97.8)
PLATELET # BLD AUTO: 227 K/UL (ref 164–446)
PMV BLD AUTO: 10.5 FL (ref 9–12.9)
RBC # BLD AUTO: 3.19 M/UL (ref 4.2–5.4)
WBC # BLD AUTO: 18 K/UL (ref 4.8–10.8)

## 2020-01-21 PROCEDURE — 36415 COLL VENOUS BLD VENIPUNCTURE: CPT

## 2020-01-21 PROCEDURE — 700112 HCHG RX REV CODE 229: Performed by: OBSTETRICS & GYNECOLOGY

## 2020-01-21 PROCEDURE — A9270 NON-COVERED ITEM OR SERVICE: HCPCS | Performed by: ANESTHESIOLOGY

## 2020-01-21 PROCEDURE — 770002 HCHG ROOM/CARE - OB PRIVATE (112)

## 2020-01-21 PROCEDURE — A9270 NON-COVERED ITEM OR SERVICE: HCPCS | Performed by: OBSTETRICS & GYNECOLOGY

## 2020-01-21 PROCEDURE — 700102 HCHG RX REV CODE 250 W/ 637 OVERRIDE(OP): Performed by: OBSTETRICS & GYNECOLOGY

## 2020-01-21 PROCEDURE — 85027 COMPLETE CBC AUTOMATED: CPT

## 2020-01-21 PROCEDURE — 700102 HCHG RX REV CODE 250 W/ 637 OVERRIDE(OP): Performed by: ANESTHESIOLOGY

## 2020-01-21 PROCEDURE — 700111 HCHG RX REV CODE 636 W/ 250 OVERRIDE (IP): Performed by: ANESTHESIOLOGY

## 2020-01-21 RX ORDER — ONDANSETRON 2 MG/ML
4 INJECTION INTRAMUSCULAR; INTRAVENOUS EVERY 6 HOURS PRN
Status: DISCONTINUED | OUTPATIENT
Start: 2020-01-21 | End: 2020-01-22 | Stop reason: HOSPADM

## 2020-01-21 RX ORDER — METOCLOPRAMIDE HYDROCHLORIDE 5 MG/ML
10 INJECTION INTRAMUSCULAR; INTRAVENOUS EVERY 6 HOURS PRN
Status: DISCONTINUED | OUTPATIENT
Start: 2020-01-21 | End: 2020-01-22 | Stop reason: HOSPADM

## 2020-01-21 RX ORDER — HYDROCODONE BITARTRATE AND ACETAMINOPHEN 5; 325 MG/1; MG/1
1 TABLET ORAL EVERY 4 HOURS PRN
Status: DISCONTINUED | OUTPATIENT
Start: 2020-01-21 | End: 2020-01-22 | Stop reason: HOSPADM

## 2020-01-21 RX ORDER — ACETAMINOPHEN 325 MG/1
325 TABLET ORAL EVERY 4 HOURS PRN
Status: DISCONTINUED | OUTPATIENT
Start: 2020-01-21 | End: 2020-01-22 | Stop reason: HOSPADM

## 2020-01-21 RX ORDER — IBUPROFEN 600 MG/1
600 TABLET ORAL EVERY 6 HOURS PRN
Status: DISCONTINUED | OUTPATIENT
Start: 2020-01-21 | End: 2020-01-22 | Stop reason: HOSPADM

## 2020-01-21 RX ORDER — ONDANSETRON 4 MG/1
4 TABLET, ORALLY DISINTEGRATING ORAL EVERY 6 HOURS PRN
Status: DISCONTINUED | OUTPATIENT
Start: 2020-01-21 | End: 2020-01-22 | Stop reason: HOSPADM

## 2020-01-21 RX ORDER — HYDROCODONE BITARTRATE AND ACETAMINOPHEN 10; 325 MG/1; MG/1
1 TABLET ORAL EVERY 4 HOURS PRN
Status: DISCONTINUED | OUTPATIENT
Start: 2020-01-21 | End: 2020-01-22 | Stop reason: HOSPADM

## 2020-01-21 RX ADMIN — IBUPROFEN 600 MG: 600 TABLET ORAL at 16:55

## 2020-01-21 RX ADMIN — KETOROLAC TROMETHAMINE 30 MG: 30 INJECTION, SOLUTION INTRAMUSCULAR at 06:20

## 2020-01-21 RX ADMIN — ACETAMINOPHEN 1000 MG: 500 TABLET, FILM COATED ORAL at 06:20

## 2020-01-21 RX ADMIN — OXYCODONE HYDROCHLORIDE 5 MG: 5 TABLET ORAL at 03:34

## 2020-01-21 RX ADMIN — KETOROLAC TROMETHAMINE 30 MG: 30 INJECTION, SOLUTION INTRAMUSCULAR at 12:46

## 2020-01-21 RX ADMIN — ACETAMINOPHEN 1000 MG: 500 TABLET, FILM COATED ORAL at 00:52

## 2020-01-21 RX ADMIN — KETOROLAC TROMETHAMINE 30 MG: 30 INJECTION, SOLUTION INTRAMUSCULAR at 00:52

## 2020-01-21 RX ADMIN — OXYCODONE HYDROCHLORIDE 10 MG: 10 TABLET ORAL at 08:41

## 2020-01-21 RX ADMIN — HYDROCODONE BITARTRATE AND ACETAMINOPHEN 1 TABLET: 10; 325 TABLET ORAL at 21:11

## 2020-01-21 RX ADMIN — ACETAMINOPHEN 1000 MG: 500 TABLET, FILM COATED ORAL at 12:47

## 2020-01-21 RX ADMIN — OXYCODONE HYDROCHLORIDE 10 MG: 10 TABLET ORAL at 12:46

## 2020-01-21 RX ADMIN — DOCUSATE SODIUM 100 MG: 100 CAPSULE, LIQUID FILLED ORAL at 16:55

## 2020-01-21 NOTE — PROGRESS NOTES
0700 Received bedside report from DAVID Doss. Discussed plan of care. Patient will call for pain medication as needed. All needs met at this time.

## 2020-01-21 NOTE — LACTATION NOTE
"This note was copied from a baby's chart.  Follow-up visit, Baby 39.4 weeks, . Mother reports she started supplementing early on with first baby and lost milk supply by 3 months. Discussed \"supply & demand\" with mother, reinforced putting baby skin to skin & frequent stimulation/latching will protect milk supply, mother voiced understanding. Breast massage & hand expressed easily several drops of colostrum. Baby started to cue, assisted baby to left breast using cross cradle hold, skin to skin, baby latched with few sucks then pulled off several times, then fell asleep. Explained with demo safe skin to skin, left baby on mother skin to skin. Hand expression demo done, expressed a few drops on spoon then attempted to feed back baby sleepy, wiped colostrum on babies lips. Encouraged mother to hand express & spoon feed 4-5 times during the day to stimulate breasts & increase future milk supply.      Teaching on hunger cues, breastfeeding when baby shows cues or by 3 hours from last feed, importance of skin to skin, positioning baby at breast nipple to nose & cluster feeding.     Breastfeeding POC:  Breastfeed when baby shows hunger cues or by 3 hours from last feed. Once discharged F/U with WIC for OP lactation needs.   "

## 2020-01-21 NOTE — CARE PLAN
Problem: Altered physiologic condition related to postoperative  delivery  Goal: Patient physiologically stable as evidenced by normal lochia, palpable uterine involution and vital signs within normal limits  Outcome: PROGRESSING AS EXPECTED  Note:   VSS. Fundus firm with light lochia. Patient educated on when to pull emergency call light.      Problem: Alteration in comfort related to surgical incision and/or after birth pains  Goal: Patient is able to ambulate, care for self and infant with acceptable pain level  Outcome: PROGRESSING AS EXPECTED  Note:   Patient requesting pain medication to be given when available.

## 2020-01-21 NOTE — CONSULTS
Lactation note:  Initial visit. Discussed normal  feeding behaviors and normal course of breastfeeding at 12-24-48-72 hours, and what to expect. Discussed importance of offering breast with feeding cues or at least every 2-3 hours, and even if infant shows no interest, can do hand expression into infant's lips. Showed MOB how to hand express colostrum. Encouraged to continue doing skin to skin. Discussed indicators of an ideal deep latch, voiding and stooling patterns, feeding cues, stomach size, and importance of establishing milk supply with frequency of feedings.   Plan for tonight is to continue to offer breast first, if not latching well, can hand express colostrum, and refeed to infant.    Encouraged her to continue to work on deep latch, and skin 2 skin, with hand expression.  Mother has WIC on [place], encouraged her to call to make an appointment for lactation support.  Information and phone numbers to the [facility] provided & invited to breastfeeding circles.    Parents also want circumcision. Discussed possible effects on wakefulness of infant for feedings after the procedure.    MOB has no other questions or concerns regarding breastfeeding. Encouraged to call for assistance as needed.

## 2020-01-21 NOTE — PROGRESS NOTES
Received bedside report from DAVID Hubbard. Patient in bed, pulse oximeter and SCD's on, and corley draining to gravity. Patient with complaints of pain, medicated see MAR. Patient would like to receive pain medication when available. Whiteboards updated, POC discussed. Call light within reach. Patient encouraged to call with any needs and or concerns.

## 2020-01-21 NOTE — PROGRESS NOTES
Pt received to room 310. Report received from L&D RN. Pt oriented to room, call light, infant security, emergency light, visiting hours and unit routine. Plan of care discussed encouraged to call with needs.

## 2020-01-21 NOTE — PROGRESS NOTES
Post Partum Progress Note    Name:   Jayson Phoenix   Date/Time:  2020 - 8:31 AM  Chief Admitting Dx:  Pregnancy  Labor and delivery, indication for care  Delivery Type:   for repeat  Post-Op/Post Partum Days #:  1    Subjective:  Abdominal pain: no  Ambulating:   yes  Tolerating liquids:  yes  Tolerating food:  yes common adult  Flatus:   yes  BM:    no  Bleeding:   with a small amount of bleeding  Voiding:   yes  Dizziness:   no  Feeding:   breast    Vitals:    20 0200 20 0300 20 0400 20 0600   BP: 108/63   117/70   Pulse: 64 (!) 57 75 64   Resp: 18 16 19 18   Temp: 36.4 °C (97.6 °F)   36.4 °C (97.6 °F)   TempSrc: Temporal   Temporal   SpO2: 96% 97% 95% 97%   Weight:       Height:           Exam:  Breast: Lactating yes  Abdomen: Abdomen soft, non-tender. BS normal. No masses,  No organomegaly  Fundal Tenderness:  no  Fundus Firm: yes  Incision: clean, dry, occlusive dressing in place  Below umbilicus: yes  Perineum: perineum intact  Lochia: moderate  Extremities: Normal extremities, peripheral pulses and reflexes normal    Meds:  Current Facility-Administered Medications   Medication Dose   • electrolyte-A (PLASMALYTE-A) infusion 500 mL  500 mL   • LR infusion     • PRN oxytocin (PITOCIN) (20 Units/1000 mL) PRN for excessive uterine bleeding - See Admin Instr  125-999 mL/hr   • miSOPROStol (CYTOTEC) tablet 600 mcg  600 mcg   • docusate sodium (COLACE) capsule 100 mg  100 mg   • prenatal plus vitamin (STUARTNATAL 1+1) 27-1 MG tablet 1 Tab  1 Tab   • acetaminophen (TYLENOL) tablet 1,000 mg  1,000 mg   • ketorolac (TORADOL) injection 30 mg  30 mg   • oxyCODONE immediate-release (ROXICODONE) tablet 5 mg  5 mg   • oxyCODONE immediate release (ROXICODONE) tablet 10 mg  10 mg   • HYDROmorphone pf (DILAUDID) injection 0.2 mg  0.2 mg   • ondansetron (ZOFRAN) syringe/vial injection 4 mg  4 mg   • diphenhydrAMINE (BENADRYL) injection 12.5 mg  12.5 mg   • lactated ringers  infusion         Labs:   Recent Labs     20  1105 20  0445   WBC 12.1* 18.0*   RBC 3.93* 3.19*   HEMOGLOBIN 12.4 10.3*   HEMATOCRIT 36.7* 30.5*   MCV 93.4 95.6   MCH 31.6 32.3   MCHC 33.8 33.8   RDW 46.3 45.9   PLATELETCT 264 227   MPV 10.2 10.5       Assessment:  Chief Admitting Dx: Pregnancy  Delivery Type:  for repeat  Tubal Ligation: No  Pain well controlled, breastfeeding initiated      Plan:  Continue routine post partum care.  Lactation consultation  Discharge planned for tomorrow    AV Frost     Findings and plan of care reviewed with Dr. Callahan

## 2020-01-21 NOTE — ANESTHESIA POSTPROCEDURE EVALUATION
Patient: Jayson Phoenix    Procedure Summary     Date:  20 Room / Location:  [facility] OR 01 / LABOR AND DELIVERY    Anesthesia Start:  1209 Anesthesia Stop:  1309    Procedure:   SECTION, REPEAT (Abdomen) Diagnosis:       Status post repeat low transverse  section      (repeat  section)    Surgeon:  Fabio Callahan M.D. Responsible Provider:  Shy Quezada M.D.    Anesthesia Type:  spinal ASA Status:  2          Final Anesthesia Type: spinal  Last vitals  BP   Blood Pressure: (!) 91/60(RN notified)    Temp   36.4 °C (97.6 °F)    Pulse   Pulse: 65   Resp   16    SpO2   96 %      Anesthesia Post Evaluation    Patient location during evaluation: PACU  Patient participation: complete - patient participated  Level of consciousness: awake and alert    Airway patency: patent  Anesthetic complications: no  Cardiovascular status: hemodynamically stable  Respiratory status: acceptable  Hydration status: euvolemic    PONV: none           Nurse Pain Score: 1 (NPRS)

## 2020-01-21 NOTE — CARE PLAN
Problem: Pain Management  Goal: Pain level will decrease to patient's comfort goal  Outcome: PROGRESSING AS EXPECTED     Problem: Altered physiologic condition related to postoperative  delivery  Goal: Patient physiologically stable as evidenced by normal lochia, palpable uterine involution and vital signs within normal limits  Outcome: PROGRESSING AS EXPECTED     Problem: Potential for postpartum infection related to surgical incision, compromised uterine condition, urinary tract or respiratory compromise  Goal: Patient will be afebrile and free from signs and symptoms of infection  Outcome: PROGRESSING AS EXPECTED

## 2020-01-22 VITALS
BODY MASS INDEX: 30.18 KG/M2 | HEART RATE: 63 BPM | OXYGEN SATURATION: 95 % | HEIGHT: 62 IN | RESPIRATION RATE: 17 BRPM | TEMPERATURE: 97.6 F | SYSTOLIC BLOOD PRESSURE: 116 MMHG | DIASTOLIC BLOOD PRESSURE: 57 MMHG | WEIGHT: 164 LBS

## 2020-01-22 PROCEDURE — 700112 HCHG RX REV CODE 229: Performed by: OBSTETRICS & GYNECOLOGY

## 2020-01-22 PROCEDURE — A9270 NON-COVERED ITEM OR SERVICE: HCPCS | Performed by: OBSTETRICS & GYNECOLOGY

## 2020-01-22 PROCEDURE — 700102 HCHG RX REV CODE 250 W/ 637 OVERRIDE(OP): Performed by: OBSTETRICS & GYNECOLOGY

## 2020-01-22 RX ORDER — IBUPROFEN 600 MG/1
600 TABLET ORAL EVERY 6 HOURS PRN
Qty: 30 TAB | Refills: 1 | Status: SHIPPED | OUTPATIENT
Start: 2020-01-22 | End: 2020-01-22

## 2020-01-22 RX ORDER — HYDROCODONE BITARTRATE AND ACETAMINOPHEN 5; 325 MG/1; MG/1
1 TABLET ORAL EVERY 4 HOURS PRN
Qty: 15 TAB | Refills: 0 | Status: SHIPPED | OUTPATIENT
Start: 2020-01-22 | End: 2020-02-12

## 2020-01-22 RX ORDER — IBUPROFEN 600 MG/1
600 TABLET ORAL EVERY 6 HOURS PRN
Qty: 30 TAB | Refills: 1 | Status: SHIPPED | OUTPATIENT
Start: 2020-01-22

## 2020-01-22 RX ADMIN — IBUPROFEN 600 MG: 600 TABLET ORAL at 00:22

## 2020-01-22 RX ADMIN — HYDROCODONE BITARTRATE AND ACETAMINOPHEN 1 TABLET: 10; 325 TABLET ORAL at 11:20

## 2020-01-22 RX ADMIN — VITAMIN A, VITAMIN C, VITAMIN D-3, VITAMIN E, VITAMIN B-1, VITAMIN B-2, NIACIN, VITAMIN B-6, CALCIUM, IRON, ZINC, COPPER 1 TABLET: 4000; 120; 400; 22; 1.84; 3; 20; 10; 1; 12; 200; 27; 25; 2 TABLET ORAL at 06:15

## 2020-01-22 RX ADMIN — IBUPROFEN 600 MG: 600 TABLET ORAL at 06:15

## 2020-01-22 RX ADMIN — IBUPROFEN 600 MG: 600 TABLET ORAL at 11:57

## 2020-01-22 RX ADMIN — DOCUSATE SODIUM 100 MG: 100 CAPSULE, LIQUID FILLED ORAL at 11:20

## 2020-01-22 RX ADMIN — HYDROCODONE BITARTRATE AND ACETAMINOPHEN 1 TABLET: 10; 325 TABLET ORAL at 06:15

## 2020-01-22 RX ADMIN — HYDROCODONE BITARTRATE AND ACETAMINOPHEN 1 TABLET: 10; 325 TABLET ORAL at 02:03

## 2020-01-22 NOTE — LACTATION NOTE
This note was copied from a baby's chart.  BREASTFEEDING DISCHARGE INSTRUCTIONS   Mom P2 who breast fed for 3 months with her 2 yr old son. Mom introduced bottles early. Currently this baby was born at 39.4 wks. vaginally weighing #7 0.2 oz. Mom reports positive breast changes in pregnancy and has been able to hand express colostrum.  LC extensively reviewed how to tell if baby is getting enough from the breast after discharge. Mom encouraged to follow up with pediatrician tomorrow if choosing to be discharge to day. When LC arrived baby was in cradle hold on R breast and suckling slowly. Reviewed with mother position of comfort with baby's ear, shoulder and hip aligned and turned tummy to tummy with mom. Also dicussed keeping baby engaged in feeding and active on the breast.     POC: demand feed 10 or more times in 24 hours, hand massage downwards during feeds to help with expressing colostrum/ milk, especially during pause. Express colostrum/milk onto nipple prior to and after feedings to soothe and keep nipple clean. Avoid picking at nipple and wash hands before feedings.  If any concern that baby is not getting enough from the breast, mom will begin pumping after feedings and providing a supplement of her EXBM or formula according to Supplemental Volumes Guidelines handout and contact pediatrician. Call [facility] for breastfeeding support or attend breastfeeding [place].     Teaching Provided  Hand Expression Taught: (gave information ot view hand expression video and demonstrated for mom, )  Latch-on Techniques Taught: (placed baby with tip of nose across from nipple, fingers placed back on areoal not pinching nipple into baby 's mouth, gently support breats in C hold)  Milk Making Process, Supply and Demand, and Emptying Taught: (reviewed supply and demand, importance of regular stimulation at breast, offering both breast at each feeding)  Positioning Techniques Taught: (align ear, shoulder and hip of baby and place  close to mom tummy to tummy, small blanket roll under breast for lift, use pillow support under baby )  Recognizing Feeding Cues Taught: (observe early cues of baby's hands to mouth, stirring in sleep, rooting towards breast)  Supplementation Taught: (mom is concerned if baby getting enough, reviewed signs of effective milk transfer and how to tell if baby getting enough)

## 2020-01-22 NOTE — PROGRESS NOTES
Received report from DAVID High. Patient sleeping with do not disturb sign.     2000: Whiteboards updated, POC discussed. Educated packet given and discussed. INJOY card given.

## 2020-01-22 NOTE — DISCHARGE SUMMARY
Discharge Summary:      Jayson Phoenix      Admit Date:   2020  Discharge Date:  2020     Admitting diagnosis:  Pregnancy  Labor and delivery, indication for care  Discharge Diagnosis: Status post  for repeat.  Pregnancy Complications: none  Tubal Ligation:  no        History:  Past Medical History:   Diagnosis Date   • Allergy    • Asthma    • GERD (gastroesophageal reflux disease)    • Headache(784.0)    • Hypertension    • IBD (inflammatory bowel disease)    • Pregnant    • Substance abuse (HCC)     Smoker   • Urinary tract infection, site not specified      OB History    Para Term  AB Living   5 1 1   2 1   SAB TAB Ectopic Molar Multiple Live Births   2       0 1      # Outcome Date GA Lbr Guillermo/2nd Weight Sex Delivery Anes PTL Lv   5 Term 20 39w4d  3.18 kg (7 lb 0.2 oz) M CS-LTranv Spinal N DANNY   4 SAB 2011 8w0d          3             2 SAB            1                Birth Comments: System Generated. Please review and update pregnancy details.        Patient has no known allergies.  Patient Active Problem List    Diagnosis Date Noted   • Asthma 2020   • Antepartum mild preeclampsia, third trimester 2017        Hospital Course:   24 y.o. , now para 2, was admitted with the above mentioned diagnosis, underwent Repeat  repeat,  for repeat. Patient postpartum course was unremarkable, with progressive advancement in diet , ambulation and toleration of oral analgesia. Patient without complaints today and desires discharge.      Vitals:    20 0600 20 1000 20 1730 20 0600   BP: 117/70 114/64 114/69 116/57   Pulse: 64 70 70 63   Resp: 18    Temp: 36.4 °C (97.6 °F) 36.6 °C (97.9 °F) 36.6 °C (97.9 °F) 36.4 °C (97.6 °F)   TempSrc: Temporal Temporal Temporal Temporal   SpO2: 97% 97% 97% 95%   Weight:       Height:           Current Facility-Administered Medications   Medication Dose   •  ibuprofen (MOTRIN) tablet 600 mg  600 mg   • acetaminophen (TYLENOL) tablet 325 mg  325 mg   • HYDROcodone-acetaminophen (NORCO) 5-325 MG per tablet 1 Tab  1 Tab   • HYDROcodone/acetaminophen (NORCO)  MG per tablet 1 Tab  1 Tab   • ondansetron (ZOFRAN) syringe/vial injection 4 mg  4 mg    Or   • ondansetron (ZOFRAN ODT) dispertab 4 mg  4 mg   • metoclopramide (REGLAN) injection 10 mg  10 mg   • electrolyte-A (PLASMALYTE-A) infusion 500 mL  500 mL   • LR infusion     • PRN oxytocin (PITOCIN) (20 Units/1000 mL) PRN for excessive uterine bleeding - See Admin Instr  125-999 mL/hr   • miSOPROStol (CYTOTEC) tablet 600 mcg  600 mcg   • docusate sodium (COLACE) capsule 100 mg  100 mg   • prenatal plus vitamin (STUARTNATAL 1+1) 27-1 MG tablet 1 Tab  1 Tab   • lactated ringers infusion         Exam:  Breast Exam: negative  Abdomen: Abdomen soft, non-tender. BS normal. No masses,  No organomegaly  Fundus Non Tender: yes  Incision: healing well with good reapproximation  Perineum: perineum intact  Extremity: extremities, peripheral pulses and reflexes normal, no edema, redness or tenderness in the calves or thighs     Labs:  Recent Labs     01/20/20  1105 01/21/20  0445   WBC 12.1* 18.0*   RBC 3.93* 3.19*   HEMOGLOBIN 12.4 10.3*   HEMATOCRIT 36.7* 30.5*   MCV 93.4 95.6   MCH 31.6 32.3   MCHC 33.8 33.8   RDW 46.3 45.9   PLATELETCT 264 227   MPV 10.2 10.5        Activity:   Discharge to home  Pelvic Rest x 6 weeks    Assessment:  normal postpartum course  Discharge Assessment: No areas of skin breakdown/redness; surgical incision intact/healing     Follow up: 2 weeks in office.     Discharge Meds:   Current Outpatient Medications   Medication Sig Dispense Refill   • ibuprofen (MOTRIN) 600 MG Tab Take 1 Tab by mouth every 6 hours as needed (For cramping after delivery; do not give if patient is receiving ketorolac (Toradol)) for up to 30 doses. 30 Tab 1   • HYDROcodone-acetaminophen (NORCO) 5-325 MG Tab per tablet Take 1 Tab  by mouth every four hours as needed (for after delivery) for up to 21 days. 15 Tab 0       Fabio Callahan M.D.

## 2020-01-22 NOTE — PROGRESS NOTES
Received report from Lizy HERNANDEZ. Pt resting in bed, pain 2/10, no further needs at this time.

## 2020-01-22 NOTE — DISCHARGE INSTRUCTIONS
POSTPARTUM DISCHARGE INSTRUCTIONS FOR MOM    YOB: 1995   Age: 24 y.o.               Admit Date: 2020     Discharge Date: 2020  Attending Doctor:  Fabio Callahan M.D.                  Allergies:  Patient has no known allergies.    Discharged to home by car. Discharged via wheelchair, hospital escort: Yes.  Special equipment needed: Not Applicable  Belongings with: Personal  Be sure to schedule a follow-up appointment with your primary care doctor or any specialists as instructed.     Discharge Plan:   Diet Plan: Discussed  Activity Level: Discussed  Confirmed Follow up Appointment: Patient to Call and Schedule Appointment  Confirmed Symptoms Management: Discussed  Medication Reconciliation Updated: Yes  Influenza Vaccine Indication: Not indicated: Previously immunized this influenza season and > 8 years of age    REASONS TO CALL YOUR OBSTETRICIAN:  1.   Persistent fever or shaking chills (Temperature higher than 100.4)  2.   Heavy bleeding (soaking more than 1 pad per hour); Passing clots  3.   Foul odor from vagina  4.   Mastitis (Breast infection; breast pain, chills, fever, redness)  5.   Urinary pain, burning or frequency  6.   Episiotomy infection  7.   Abdominal incision infection  8.   Severe depression longer than 24 hours    HAND WASHING  · Prior to handling the baby.  · Before breastfeeding or bottle feeding baby.  · After using the bathroom or changing the baby's diaper.    WOUND CARE  Ask your physician for additional care instructions.  In general:    ·  Incision:      · Keep clean and dry.    · Do NOT lift anything heavier than your baby for up to 6 weeks.    · There should not be any opening or pus.      VAGINAL CARE  · Nothing inside vagina for 6 weeks: no sexual intercourse, tampons or douching.  · Bleeding may continue for 2-4 weeks.  Amount may vary.    · Call your physician for heavy bleeding which means soaking more than 1 pad per hour    BIRTH CONTROL  · It is  "possible to become pregnant at any time after delivery and while breastfeeding.  · Plan to discuss a method of birth control with your physician at your follow up visit. visit.    DIET AND ELIMINATION  · Eating more fiber (bran cereal, fruits, and vegetables) and drinking plenty of fluids will help to avoid constipation.  · Urinary frequency after childbirth is normal.    POSTPARTUM BLUES  During the first few days after birth, you may experience a sense of the \"blues\" which may include impatience, irritability or even crying.  These feeling come and go quickly.  However, as many as 1 in 10 women experience emotional symptoms known as postpartum depression.    Postpartum depression:  May start as early as the second or third day after delivery or take several weeks or months to develop.  Symptoms of \"blues\" are present, but are more intense:  Crying spells; loss of appetite; feelings of hopelessness or loss of control; fear of touching the baby; over concern or no concern at all about the baby; little or no concern about your own appearance/caring for yourself; and/or inability to sleep or excessive sleeping.  Contact your physician if you are experiencing any of these symptoms.    Crisis Hotline:  · [place] Crisis Hotline:  3-351-OUTXRJG  Or 1-370.832.9971  · [place] Crisis Hotline:  1-490.591.1070  Or 795-345-9089    PREVENTING SHAKEN BABY:  If you are angry or stressed, PUT THE BABY IN THE CRIB, step away, take some deep breaths, and wait until you are calm to care for the baby.  DO NOT SHAKE THE BABY.  You are not alone, call a supporter for help.    · Crisis Call Center 24/7 crisis line 646-716-6191 or 1-538.528.3810  · You can also text them, text \"ANSWER\" to 700290    QUIT SMOKING/TOBACCO USE:  I understand the use of any tobacco products increases my chance of suffering from future heart disease and could cause other illnesses which may shorten my life. Quitting the use of tobacco products is the single most " important thing I can do to improve my health. For further information on smoking / tobacco cessation call a Toll Free Quit Line at 1-488.801.6945 (*[employer] [place]) or 1-336.547.7247 (American [employer] Association) or you can access the web based program at www.lungusa.org.    · [place] Tobacco Users Help Line:  (967) 214-1470       Toll Free: 1-288.653.4823  · Quit Tobacco Program [facility] (728)847-5891    DEPRESSION / SUICIDE RISK:  As you are discharged from this [facility], it is important to learn how to keep safe from harming yourself.    Recognize the warning signs:  · Abrupt changes in personality, positive or negative- including increase in energy   · Giving away possessions  · Change in eating patterns- significant weight changes-  positive or negative  · Change in sleeping patterns- unable to sleep or sleeping all the time   · Unwillingness or inability to communicate  · Depression  · Unusual sadness, discouragement and loneliness  · Talk of wanting to die  · Neglect of personal appearance   · Rebelliousness- reckless behavior  · Withdrawal from people/activities they love  · Confusion- inability to concentrate     If you or a loved one observes any of these behaviors or has concerns about self-harm, here's what you can do:  · Talk about it- your feelings and reasons for harming yourself  · Remove any means that you might use to hurt yourself (examples: pills, rope, extension cords, firearm)  · Get professional help from the community (Mental Health, Substance Abuse, psychological counseling)  · Do not be alone:Call your Safe Contact- someone whom you trust who will be there for you.  · Call your local CRISIS HOTLINE 651-4303 or 576-257-6487  · Call your local Children's Mobile Crisis Response Team [place] (781) 903-4754 or www.[employer]  · Call the toll free National Suicide Prevention Hotlines   · National Suicide Prevention Lifeline 293-799-VBMZ  (4643)  · [facility] 800-SUICIDE (420-8912)    DISCHARGE SURVEY:  Thank you for choosing [facility].  We hope we provided you with very good care.  You may be receiving a survey in the mail.  Please fill it out.  Your opinion is valuable to us.    ADDITIONAL EDUCATIONAL MATERIALS GIVEN TO PATIENT:        My signature on this form indicates that:  1.  I have reviewed and understand the above information  2.  My questions regarding this information have been answered to my satisfaction.  3.  I have formulated a plan with my discharge nurse to obtain my prescribed medication for home.

## 2020-01-22 NOTE — PROGRESS NOTES
Discharge instruction for mom and baby discussed. Prescription given and explained. Emphasized the importance of  screening follow-up test. Questions and concerns have been answered.

## 2020-01-22 NOTE — PROGRESS NOTES
Pt discharged at approximately 1415 via wheelchair with hospital escort. Infant placed in car seat by parent, and checked by RN.  Pt discharge instructions provided provided by Mandi HERNANDEZ. Mandi HERNANDEZ Checked armbands, removed clamp and cuddles. No further questions at this time.

## 2023-04-25 NOTE — PROGRESS NOTES
Pt care assumed and assessment completed.  Pt is aao and up self, steady.  Medicated pain per MAR.  POC discussed and all questions answered.  Fundus is firm and lochia is lite.  Dressing is cdi.  + void.  + flatus.  Pt and fob educated on frequency of feeding times as infant is down 6.29%, parents verbalized understanding.  Nursery notified as well.  Call light within reach and q2 rounds in place   Detail Level: Detailed Quality 137: Melanoma: Continuity Of Care - Recall System: Patient information entered into a recall system that includes: target date for the next exam specified AND a process to follow up with patients regarding missed or unscheduled appointments Quality 138: Melanoma: Coordination Of Care: A treatment plan was communicated to the physicians providing continuing care within one month of diagnosis outlining: diagnosis, tumor thickness and a plan for surgery or alternate care.

## 2024-05-03 ENCOUNTER — OFFICE VISIT (OUTPATIENT)
Dept: URGENT CARE | Facility: CLINIC | Age: 29
End: 2024-05-03
Payer: MEDICAID

## 2024-05-03 VITALS
WEIGHT: 264.6 LBS | TEMPERATURE: 97.2 F | SYSTOLIC BLOOD PRESSURE: 128 MMHG | BODY MASS INDEX: 48.69 KG/M2 | OXYGEN SATURATION: 97 % | HEART RATE: 86 BPM | DIASTOLIC BLOOD PRESSURE: 80 MMHG | RESPIRATION RATE: 16 BRPM | HEIGHT: 62 IN

## 2024-05-03 DIAGNOSIS — J02.9 SORE THROAT: ICD-10-CM

## 2024-05-03 DIAGNOSIS — Z87.09 HISTORY OF ASTHMA: ICD-10-CM

## 2024-05-03 DIAGNOSIS — J02.0 STREP PHARYNGITIS: ICD-10-CM

## 2024-05-03 LAB — S PYO DNA SPEC NAA+PROBE: DETECTED

## 2024-05-03 PROCEDURE — 99213 OFFICE O/P EST LOW 20 MIN: CPT | Performed by: PHYSICIAN ASSISTANT

## 2024-05-03 PROCEDURE — 3079F DIAST BP 80-89 MM HG: CPT | Performed by: PHYSICIAN ASSISTANT

## 2024-05-03 PROCEDURE — 87651 STREP A DNA AMP PROBE: CPT | Performed by: PHYSICIAN ASSISTANT

## 2024-05-03 PROCEDURE — 3074F SYST BP LT 130 MM HG: CPT | Performed by: PHYSICIAN ASSISTANT

## 2024-05-03 RX ORDER — HYDROXYZINE HYDROCHLORIDE 25 MG/1
TABLET, FILM COATED ORAL
COMMUNITY
Start: 2024-04-22

## 2024-05-03 RX ORDER — ESCITALOPRAM OXALATE 10 MG/1
TABLET ORAL
COMMUNITY
Start: 2024-04-22

## 2024-05-03 RX ORDER — FLUTICASONE PROPIONATE AND SALMETEROL 100; 50 UG/1; UG/1
POWDER RESPIRATORY (INHALATION)
COMMUNITY
Start: 2024-04-22

## 2024-05-03 RX ORDER — ALBUTEROL SULFATE 90 UG/1
AEROSOL, METERED RESPIRATORY (INHALATION)
COMMUNITY
Start: 2024-04-22

## 2024-05-03 RX ORDER — AMOXICILLIN 500 MG/1
500 CAPSULE ORAL 2 TIMES DAILY
Qty: 20 CAPSULE | Refills: 0 | Status: SHIPPED | OUTPATIENT
Start: 2024-05-03 | End: 2024-05-13

## 2024-05-03 NOTE — PROGRESS NOTES
Subjective:   Jayson Phoenix is a 29 y.o. female who presents for Pharyngitis (X 2 days sore throat worsening/ fever/ cough/ congestion/ chills/ body aches )     ST, fever subjective since last night  Tried drinking water  Awoke all night with fevers  Pain with swallowing, is able to tolerate solids and liquids  Mild cough, congestion  Has h/o asthma  Has been using her advair and albuterol        Medications:  albuterol Aers  escitalopram Tabs  fluticasone-salmeterol Aepb  hydrOXYzine HCl Tabs  ibuprofen Tabs    Allergies:             Patient has no known allergies.    Surgical History:         Past Surgical History:   Procedure Laterality Date    REPEAT C SECTION  2020    Procedure:  SECTION, REPEAT;  Surgeon: Fabio Callahan M.D.;  Location: LABOR AND DELIVERY;  Service: Labor and Delivery    PRIMARY C SECTION  2017    Procedure: PRIMARY C SECTION;  Surgeon: Carlos Eduardo Burns M.D.;  Location: LABOR AND DELIVERY;  Service: Labor and Delivery    CYSTOSCOPY STENT PLACEMENT Left 10/19/2017    Procedure: CYSTOSCOPY STENT PLACEMENT;  Surgeon: Stef Larsen M.D.;  Location: SURGERY [facility];  Service: Urology    TONSILLECTOMY         Past Social Hx:  Jayson Phoenix  reports that she has been smoking cigarettes. She started smoking about 15 years ago. She has a 4.5 pack-year smoking history. She has never used smokeless tobacco. She reports current alcohol use of about 1.0 oz of alcohol per week. She reports current drug use. Drugs: Inhaled and Marijuana.     Past Family Hx:   Jayson Phoenix family history includes Arthritis in her maternal aunt, maternal grandfather, maternal grandmother, maternal uncle, and mother; Cancer in her maternal aunt and paternal grandfather; Diabetes in her father, paternal aunt, paternal grandmother, and paternal uncle; Genetic Disorder in her maternal grandfather; Heart Disease in her father, mother, paternal  "aunt, paternal grandmother, and paternal uncle; Hyperlipidemia in her father, mother, paternal aunt, paternal grandmother, and paternal uncle; Hypertension in her father, mother, paternal aunt, paternal grandmother, and paternal uncle; Other in her maternal grandfather.       Problem list, medications, and allergies reviewed by myself today in Epic.     Objective:     /80 (BP Location: Right arm, Patient Position: Sitting, BP Cuff Size: Adult)   Pulse 86   Temp 36.2 °C (97.2 °F) (Temporal)   Resp 16   Ht 1.575 m (5' 2\")   Wt 120 kg (264 lb 9.6 oz)   SpO2 97%   BMI 48.40 kg/m²     Physical Exam  Vitals and nursing note reviewed.   Constitutional:       General: She is not in acute distress.     Appearance: Normal appearance. She is well-developed. She is not ill-appearing or diaphoretic.   HENT:      Head: Normocephalic.      Right Ear: Tympanic membrane normal.      Left Ear: Tympanic membrane normal.      Nose: Congestion and rhinorrhea present.      Mouth/Throat:      Palate: No mass.      Pharynx: Posterior oropharyngeal erythema present. No pharyngeal swelling, oropharyngeal exudate or uvula swelling.      Tonsils: No tonsillar exudate or tonsillar abscesses. 1+ on the right. 1+ on the left.   Eyes:      Conjunctiva/sclera: Conjunctivae normal.      Pupils: Pupils are equal, round, and reactive to light.   Cardiovascular:      Rate and Rhythm: Normal rate and regular rhythm.      Pulses: Normal pulses.      Heart sounds: No murmur heard.  Pulmonary:      Effort: Pulmonary effort is normal. No tachypnea, accessory muscle usage or respiratory distress.      Breath sounds: Normal breath sounds. No stridor. No decreased breath sounds, wheezing, rhonchi or rales.      Comments: Lungs are clear to auscultation bilaterally without rhonchi rales or wheezes  Musculoskeletal:         General: Normal range of motion.      Cervical back: Normal range of motion and neck supple.   Skin:     General: Skin is warm " and dry.   Neurological:      Mental Status: She is alert and oriented to person, place, and time.   Psychiatric:         Behavior: Behavior is cooperative.         Assessment/Plan:     Diagnosis and Associated Orders:     1. Sore throat  - POCT CEPHEID GROUP A STREP - PCR    2. History of asthma    3. Strep pharyngitis  - amoxicillin (AMOXIL) 500 MG Cap; Take 1 Capsule by mouth 2 times a day for 10 days.  Dispense: 20 Capsule; Refill: 0    Other orders  - escitalopram (LEXAPRO) 10 MG Tab; TAKE 1 AND 12 TABLET BY MOUTH ONCE DAILY IN THE MORNING FOR ANXIETY  - hydrOXYzine HCl (ATARAX) 25 MG Tab; TAKE 1 TABLET BY MOUTH ONCE DAILY AT BEDTIME AS NEEDED FOR ANXIETY  - albuterol 108 (90 Base) MCG/ACT Aero Soln inhalation aerosol; INHALE 1 PUFF BY MOUTH EVERY 4 HOURS AS NEEDED FOR ASTHMA  - fluticasone-salmeterol (ADVAIR) 100-50 MCG/ACT AEROSOL POWDER, BREATH ACTIVATED; INHALE 1 PUFF BY MOUTH TWICE A DAY REPLACES SYMBICORT        Comments/MDM:  POSITIVE Strep A.  Discussed antibiotic treatment for full 10 days, salt water gargles, soft foods, cool liquids, ibuprofen and Tylenol for any pain or fevers. Switch out your toothbrush for a new toothbrush in 2-3 days time.  You will be contagious for 24 hours after initiation of antibiotis.   Return to the urgent care if symptoms are not improving or any worsening symptoms or concerns. Present to the emergency room or call 911 if any severe swelling of the throat, difficulty swallowing, difficulty breathing, wheezing, or any other severe concerns.     I personally reviewed prior external notes and test results pertinent to today's visit. Supportive care, natural history, differential diagnoses, and indications for immediate follow-up discussed. Return to clinic or go to ED if symptoms worsen or persist.  Red flag symptoms discussed.  Patient/Parent/Guardian voices understanding. Follow-up with your primary care provider in 3-5 days.  All side effects of medication discussed  including allergic response, GI upset, tendon injury, rash, sedation etc    Please note that this dictation was created using voice recognition software. I have made a reasonable attempt to correct obvious errors, but I expect that there are errors of grammar and possibly content that I did not discover before finalizing the note.    This note was electronically signed by Vita Freeman PA-C

## 2024-09-20 ENCOUNTER — APPOINTMENT (OUTPATIENT)
Dept: RADIOLOGY | Facility: MEDICAL CENTER | Age: 29
End: 2024-09-20
Attending: STUDENT IN AN ORGANIZED HEALTH CARE EDUCATION/TRAINING PROGRAM
Payer: MEDICAID

## 2024-09-20 ENCOUNTER — HOSPITAL ENCOUNTER (EMERGENCY)
Facility: MEDICAL CENTER | Age: 29
End: 2024-09-20
Attending: STUDENT IN AN ORGANIZED HEALTH CARE EDUCATION/TRAINING PROGRAM
Payer: MEDICAID

## 2024-09-20 VITALS
TEMPERATURE: 97.1 F | SYSTOLIC BLOOD PRESSURE: 115 MMHG | BODY MASS INDEX: 51.16 KG/M2 | RESPIRATION RATE: 20 BRPM | DIASTOLIC BLOOD PRESSURE: 68 MMHG | WEIGHT: 278 LBS | OXYGEN SATURATION: 97 % | HEIGHT: 62 IN | HEART RATE: 78 BPM

## 2024-09-20 DIAGNOSIS — R19.7 DIARRHEA OF PRESUMED INFECTIOUS ORIGIN: ICD-10-CM

## 2024-09-20 DIAGNOSIS — N20.0 LEFT RENAL STONE: ICD-10-CM

## 2024-09-20 DIAGNOSIS — R16.0 HEPATOMEGALY: ICD-10-CM

## 2024-09-20 DIAGNOSIS — R11.2 NAUSEA AND VOMITING, UNSPECIFIED VOMITING TYPE: ICD-10-CM

## 2024-09-20 DIAGNOSIS — R10.84 GENERALIZED ABDOMINAL PAIN: ICD-10-CM

## 2024-09-20 LAB
ALBUMIN SERPL BCP-MCNC: 4.3 G/DL (ref 3.2–4.9)
ALBUMIN/GLOB SERPL: 1.4 G/DL
ALP SERPL-CCNC: 79 U/L (ref 30–99)
ALT SERPL-CCNC: 29 U/L (ref 2–50)
ANION GAP SERPL CALC-SCNC: 13 MMOL/L (ref 7–16)
APPEARANCE UR: CLEAR
AST SERPL-CCNC: 19 U/L (ref 12–45)
BASOPHILS # BLD AUTO: 0.2 % (ref 0–1.8)
BASOPHILS # BLD: 0.03 K/UL (ref 0–0.12)
BILIRUB SERPL-MCNC: 0.6 MG/DL (ref 0.1–1.5)
BILIRUB UR QL STRIP.AUTO: ABNORMAL
BUN SERPL-MCNC: 17 MG/DL (ref 8–22)
CALCIUM ALBUM COR SERPL-MCNC: 8.9 MG/DL (ref 8.5–10.5)
CALCIUM SERPL-MCNC: 9.1 MG/DL (ref 8.4–10.2)
CHLORIDE SERPL-SCNC: 102 MMOL/L (ref 96–112)
CO2 SERPL-SCNC: 21 MMOL/L (ref 20–33)
COLOR UR: YELLOW
CREAT SERPL-MCNC: 0.51 MG/DL (ref 0.5–1.4)
EOSINOPHIL # BLD AUTO: 0.06 K/UL (ref 0–0.51)
EOSINOPHIL NFR BLD: 0.3 % (ref 0–6.9)
ERYTHROCYTE [DISTWIDTH] IN BLOOD BY AUTOMATED COUNT: 45.9 FL (ref 35.9–50)
GFR SERPLBLD CREATININE-BSD FMLA CKD-EPI: 128 ML/MIN/1.73 M 2
GLOBULIN SER CALC-MCNC: 3 G/DL (ref 1.9–3.5)
GLUCOSE SERPL-MCNC: 107 MG/DL (ref 65–99)
GLUCOSE UR STRIP.AUTO-MCNC: NEGATIVE MG/DL
HCG SERPL QL: NEGATIVE
HCT VFR BLD AUTO: 45.8 % (ref 37–47)
HGB BLD-MCNC: 15.6 G/DL (ref 12–16)
IMM GRANULOCYTES # BLD AUTO: 0.08 K/UL (ref 0–0.11)
IMM GRANULOCYTES NFR BLD AUTO: 0.4 % (ref 0–0.9)
KETONES UR STRIP.AUTO-MCNC: 15 MG/DL
LEUKOCYTE ESTERASE UR QL STRIP.AUTO: NEGATIVE
LIPASE SERPL-CCNC: 12 U/L (ref 11–82)
LYMPHOCYTES # BLD AUTO: 1.85 K/UL (ref 1–4.8)
LYMPHOCYTES NFR BLD: 9.5 % (ref 22–41)
MCH RBC QN AUTO: 32.4 PG (ref 27–33)
MCHC RBC AUTO-ENTMCNC: 34.1 G/DL (ref 32.2–35.5)
MCV RBC AUTO: 95.2 FL (ref 81.4–97.8)
MICRO URNS: ABNORMAL
MONOCYTES # BLD AUTO: 0.74 K/UL (ref 0–0.85)
MONOCYTES NFR BLD AUTO: 3.8 % (ref 0–13.4)
NEUTROPHILS # BLD AUTO: 16.64 K/UL (ref 1.82–7.42)
NEUTROPHILS NFR BLD: 85.8 % (ref 44–72)
NITRITE UR QL STRIP.AUTO: NEGATIVE
NRBC # BLD AUTO: 0 K/UL
NRBC BLD-RTO: 0 /100 WBC (ref 0–0.2)
PH UR STRIP.AUTO: 6 [PH] (ref 5–8)
PLATELET # BLD AUTO: 332 K/UL (ref 164–446)
PMV BLD AUTO: 9.9 FL (ref 9–12.9)
POTASSIUM SERPL-SCNC: 4.2 MMOL/L (ref 3.6–5.5)
PROT SERPL-MCNC: 7.3 G/DL (ref 6–8.2)
PROT UR QL STRIP: NEGATIVE MG/DL
RBC # BLD AUTO: 4.81 M/UL (ref 4.2–5.4)
RBC UR QL AUTO: NEGATIVE
SODIUM SERPL-SCNC: 136 MMOL/L (ref 135–145)
SP GR UR STRIP.AUTO: >=1.03
WBC # BLD AUTO: 19.4 K/UL (ref 4.8–10.8)

## 2024-09-20 PROCEDURE — 84703 CHORIONIC GONADOTROPIN ASSAY: CPT

## 2024-09-20 PROCEDURE — 83690 ASSAY OF LIPASE: CPT

## 2024-09-20 PROCEDURE — 700117 HCHG RX CONTRAST REV CODE 255: Performed by: STUDENT IN AN ORGANIZED HEALTH CARE EDUCATION/TRAINING PROGRAM

## 2024-09-20 PROCEDURE — 36415 COLL VENOUS BLD VENIPUNCTURE: CPT

## 2024-09-20 PROCEDURE — 700111 HCHG RX REV CODE 636 W/ 250 OVERRIDE (IP): Mod: JZ | Performed by: STUDENT IN AN ORGANIZED HEALTH CARE EDUCATION/TRAINING PROGRAM

## 2024-09-20 PROCEDURE — 700102 HCHG RX REV CODE 250 W/ 637 OVERRIDE(OP): Performed by: STUDENT IN AN ORGANIZED HEALTH CARE EDUCATION/TRAINING PROGRAM

## 2024-09-20 PROCEDURE — 700105 HCHG RX REV CODE 258: Performed by: STUDENT IN AN ORGANIZED HEALTH CARE EDUCATION/TRAINING PROGRAM

## 2024-09-20 PROCEDURE — 80053 COMPREHEN METABOLIC PANEL: CPT

## 2024-09-20 PROCEDURE — A9270 NON-COVERED ITEM OR SERVICE: HCPCS | Performed by: STUDENT IN AN ORGANIZED HEALTH CARE EDUCATION/TRAINING PROGRAM

## 2024-09-20 PROCEDURE — 85025 COMPLETE CBC W/AUTO DIFF WBC: CPT

## 2024-09-20 PROCEDURE — 81003 URINALYSIS AUTO W/O SCOPE: CPT

## 2024-09-20 PROCEDURE — 99285 EMERGENCY DEPT VISIT HI MDM: CPT

## 2024-09-20 PROCEDURE — 74177 CT ABD & PELVIS W/CONTRAST: CPT

## 2024-09-20 PROCEDURE — 96374 THER/PROPH/DIAG INJ IV PUSH: CPT

## 2024-09-20 RX ORDER — ONDANSETRON 4 MG/1
4 TABLET, ORALLY DISINTEGRATING ORAL EVERY 6 HOURS PRN
Qty: 10 TABLET | Refills: 0 | Status: SHIPPED | OUTPATIENT
Start: 2024-09-20

## 2024-09-20 RX ORDER — ONDANSETRON 2 MG/ML
4 INJECTION INTRAMUSCULAR; INTRAVENOUS ONCE
Status: COMPLETED | OUTPATIENT
Start: 2024-09-20 | End: 2024-09-20

## 2024-09-20 RX ORDER — DICYCLOMINE HYDROCHLORIDE 10 MG/1
10 CAPSULE ORAL
Qty: 120 CAPSULE | Refills: 0 | Status: SHIPPED | OUTPATIENT
Start: 2024-09-20

## 2024-09-20 RX ORDER — DICYCLOMINE HCL 20 MG
20 TABLET ORAL ONCE
Status: COMPLETED | OUTPATIENT
Start: 2024-09-20 | End: 2024-09-20

## 2024-09-20 RX ORDER — SODIUM CHLORIDE 9 MG/ML
1000 INJECTION, SOLUTION INTRAVENOUS ONCE
Status: COMPLETED | OUTPATIENT
Start: 2024-09-20 | End: 2024-09-20

## 2024-09-20 RX ADMIN — SODIUM CHLORIDE 1000 ML: 9 INJECTION, SOLUTION INTRAVENOUS at 20:31

## 2024-09-20 RX ADMIN — IOHEXOL 100 ML: 350 INJECTION, SOLUTION INTRAVENOUS at 19:56

## 2024-09-20 RX ADMIN — ONDANSETRON 4 MG: 2 INJECTION INTRAMUSCULAR; INTRAVENOUS at 20:32

## 2024-09-20 RX ADMIN — DICYCLOMINE HYDROCHLORIDE 20 MG: 20 TABLET ORAL at 20:32

## 2024-09-20 ASSESSMENT — PAIN DESCRIPTION - PAIN TYPE: TYPE: ACUTE PAIN

## 2024-09-21 NOTE — ED PROVIDER NOTES
"ED Provider Note    CHIEF COMPLAINT  Chief Complaint   Patient presents with    Abdominal Pain     Generalized described as \"cramping\"    Nausea/Vomiting/Diarrhea     Since this AM with chills. Denies known fever.     Hernia     Concerned for this in multiple areas of abd       EXTERNAL RECORDS REVIEWED  Seen in urgent care in May for pharyngitis    HPI/ROS  LIMITATION TO HISTORY   None  OUTSIDE HISTORIAN(S):  None    Jayson Phoenix is a 29 y.o. female who presents for evaluation of abdominal pain.  She says the pain started this morning.  She has had about 3 episodes of nonbloody nonbilious emesis.  She describes a cramping pain that seems to move around to abdomen with occasional tightness.  She has felt warm and has had chills but no measured temperatures.  She has had loose watery diarrhea today as well.  She does have a child that was recently home with a fever and stomach upset but did not have any vomiting or diarrhea.  She said that she did some taquitos yesterday which seemed to make her symptoms worse.  Patient is being worked up as an outpatient for possible hernia and is unsure if this is related.    PAST MEDICAL HISTORY   has a past medical history of Allergy, Asthma, GERD (gastroesophageal reflux disease), Headache(784.0), Hypertension, IBD (inflammatory bowel disease), Pregnant, Substance abuse (HCC), and Urinary tract infection, site not specified.    SURGICAL HISTORY   has a past surgical history that includes tonsillectomy (1998); cystoscopy stent placement (Left, 10/19/2017); primary c section (12/14/2017); and repeat c section (1/20/2020).    FAMILY HISTORY  Family History   Problem Relation Age of Onset    Arthritis Mother     Hypertension Mother     Heart Disease Mother     Hyperlipidemia Mother     Diabetes Father     Heart Disease Father     Hypertension Father     Hyperlipidemia Father     Arthritis Maternal Grandmother     Other Maternal Grandfather     Arthritis Maternal " "Grandfather     Genetic Disorder Maternal Grandfather         Alzheimer    Diabetes Paternal Grandmother     Heart Disease Paternal Grandmother     Hypertension Paternal Grandmother     Hyperlipidemia Paternal Grandmother     Cancer Paternal Grandfather         Brain    Arthritis Maternal Aunt     Cancer Maternal Aunt         Cervical    Arthritis Maternal Uncle     Diabetes Paternal Aunt     Heart Disease Paternal Aunt     Hypertension Paternal Aunt     Hyperlipidemia Paternal Aunt     Diabetes Paternal Uncle     Heart Disease Paternal Uncle     Hypertension Paternal Uncle     Hyperlipidemia Paternal Uncle        SOCIAL HISTORY  Social History     Tobacco Use    Smoking status: Every Day     Current packs/day: 0.00     Average packs/day: 1.5 packs/day for 3.0 years (4.5 ttl pk-yrs)     Types: Cigarettes     Start date: 3/1/2009     Last attempt to quit: 3/1/2012     Years since quittin.5    Smokeless tobacco: Never   Vaping Use    Vaping status: Never Used   Substance and Sexual Activity    Alcohol use: Yes     Alcohol/week: 1.0 oz     Types: 2 Cans of beer per week     Comment: social     Drug use: Yes     Types: Inhaled, Marijuana    Sexual activity: Yes     Partners: Male     Comment: none       CURRENT MEDICATIONS  Home Medications       Reviewed by Latesha Mayo R.N. (Registered Nurse) on 24 at 1722  Med List Status: Not Addressed     Medication Last Dose Status   albuterol 108 (90 Base) MCG/ACT Aero Soln inhalation aerosol  Active   escitalopram (LEXAPRO) 10 MG Tab  Active   fluticasone-salmeterol (ADVAIR) 100-50 MCG/ACT AEROSOL POWDER, BREATH ACTIVATED  Active   hydrOXYzine HCl (ATARAX) 25 MG Tab  Active   ibuprofen (MOTRIN) 600 MG Tab  Active                    ALLERGIES  No Known Allergies    PHYSICAL EXAM  VITAL SIGNS: /68   Pulse 78   Temp 36.2 °C (97.1 °F) (Temporal)   Resp 20   Ht 1.575 m (5' 2\")   Wt (!) 126 kg (278 lb)   LMP 08/10/2024   SpO2 97%   BMI 50.85 kg/m²  "   Constitutional: Awake and alert . He is non toxic  HENT: Normal inspection  Moist mucous membranes  Eyes: Normal inspection  Neck: Grossly normal range of motion.  Cardiovascular: Normal heart rate, Normal rhythm.    Thorax & Lungs: No respiratory distress  Abdomen: Soft, non-distended, she has mild diffuse TTP, no mass  Skin: No obvious rash.  Extremities: Warm, well perfused. No clubbing, cyanosis, edema   Neurologic: Grossly normal   Psychiatric: Normal for situation      EKG/LABS  Results for orders placed or performed during the hospital encounter of 09/20/24   CBC WITH DIFFERENTIAL   Result Value Ref Range    WBC 19.4 (H) 4.8 - 10.8 K/uL    RBC 4.81 4.20 - 5.40 M/uL    Hemoglobin 15.6 12.0 - 16.0 g/dL    Hematocrit 45.8 37.0 - 47.0 %    MCV 95.2 81.4 - 97.8 fL    MCH 32.4 27.0 - 33.0 pg    MCHC 34.1 32.2 - 35.5 g/dL    RDW 45.9 35.9 - 50.0 fL    Platelet Count 332 164 - 446 K/uL    MPV 9.9 9.0 - 12.9 fL    Neutrophils-Polys 85.80 (H) 44.00 - 72.00 %    Lymphocytes 9.50 (L) 22.00 - 41.00 %    Monocytes 3.80 0.00 - 13.40 %    Eosinophils 0.30 0.00 - 6.90 %    Basophils 0.20 0.00 - 1.80 %    Immature Granulocytes 0.40 0.00 - 0.90 %    Nucleated RBC 0.00 0.00 - 0.20 /100 WBC    Neutrophils (Absolute) 16.64 (H) 1.82 - 7.42 K/uL    Lymphs (Absolute) 1.85 1.00 - 4.80 K/uL    Monos (Absolute) 0.74 0.00 - 0.85 K/uL    Eos (Absolute) 0.06 0.00 - 0.51 K/uL    Baso (Absolute) 0.03 0.00 - 0.12 K/uL    Immature Granulocytes (abs) 0.08 0.00 - 0.11 K/uL    NRBC (Absolute) 0.00 K/uL   COMP METABOLIC PANEL   Result Value Ref Range    Sodium 136 135 - 145 mmol/L    Potassium 4.2 3.6 - 5.5 mmol/L    Chloride 102 96 - 112 mmol/L    Co2 21 20 - 33 mmol/L    Anion Gap 13.0 7.0 - 16.0    Glucose 107 (H) 65 - 99 mg/dL    Bun 17 8 - 22 mg/dL    Creatinine 0.51 0.50 - 1.40 mg/dL    Calcium 9.1 8.4 - 10.2 mg/dL    Correct Calcium 8.9 8.5 - 10.5 mg/dL    AST(SGOT) 19 12 - 45 U/L    ALT(SGPT) 29 2 - 50 U/L    Alkaline Phosphatase 79 30 -  99 U/L    Total Bilirubin 0.6 0.1 - 1.5 mg/dL    Albumin 4.3 3.2 - 4.9 g/dL    Total Protein 7.3 6.0 - 8.2 g/dL    Globulin 3.0 1.9 - 3.5 g/dL    A-G Ratio 1.4 g/dL   LIPASE   Result Value Ref Range    Lipase 12 11 - 82 U/L   URINALYSIS,CULTURE IF INDICATED    Specimen: Urine   Result Value Ref Range    Color Yellow     Character Clear     Specific Gravity >=1.030 <1.035    Ph 6.0 5.0 - 8.0    Glucose Negative Negative mg/dL    Ketones 15 (A) Negative mg/dL    Protein Negative Negative mg/dL    Bilirubin Small (A) Negative    Nitrite Negative Negative    Leukocyte Esterase Negative Negative    Occult Blood Negative Negative    Micro Urine Req see below    HCG QUAL SERUM   Result Value Ref Range    Beta-Hcg Qualitative Serum Negative Negative   ESTIMATED GFR   Result Value Ref Range    GFR (CKD-EPI) 128 >60 mL/min/1.73 m 2       RADIOLOGY/PROCEDURES   I have independently interpreted the diagnostic imaging associated with this visit and am waiting the final reading from the radiologist.   My preliminary interpretation is as follows: No obvious free air    Radiologist interpretation:  CT-ABDOMEN-PELVIS WITH   Final Result         1. Hepatomegaly with fatty infiltration of the liver.   2. Small left renal stone.   3. No evidence for bowel obstruction, diverticulitis, or appendicitis.          COURSE & MEDICAL DECISION MAKING    ASSESSMENT, COURSE AND PLAN  Care Narrative: This is a 29-year-old who presents with one day of diffuse crampy abdominal pain, vomiting and diarrhea.  She arrives afebrile, tachycardic but is not hypotensive and is systemically well-appearing.  She has a benign abdominal exam without rebound, guarding or signs of peritonitis.  Her labs are notable for leukocytosis of 19,000, otherwise no significant electrolyte derangements.  Her lipase is normal and she does not have pancreatitis.  She is not pregnant.  No evidence of UTI.  CT obtained without evidence of focal inflammatory or surgical process,  specifically no evidence of appendicitis or bowel obstruction.  She has no evidence of cholelithiasis and no right upper quadrant tenderness on exam to suggest cholecystitis.  She was incidentally found to have hepatomegaly and a small left renal stone and she was informed of these findings.  Unlikely related to her presentation today.  Patient was given IV fluids, Bentyl, Zofran and on reevaluation she reports significant improvement.  She has not had any further vomiting in the ER.  Possible gastroenteritis given the constellation of symptoms.  She has no fever or bloody stool to suggest an invasive bacterial etiology. Leukocytosis likely reactive in setting of vomiting. I did also explain to the patient she could have an early surgical or inflammatory process that would require repeat evaluation in the ER and she expresses understanding and will return if worsening.  She is able to tolerate p.o. . Her vital signs are normal and she was discharged in good condition.    Hydration: Based on the patient's presentation of Acute Vomiting the patient was given IV fluids. IV Hydration was used because oral hydration was not adequate alone. Upon recheck following hydration, the patient was improved.              DISPOSITION AND DISCUSSIONS  I have discussed management of the patient with the following physicians and TANIA's:  None    Discussion of management with other QHP or appropriate source(s): None      Decision tools and prescription drugs considered including, but not limited to: Pain Medications Bentyl .    FINAL DIAGNOSIS  1. Nausea and vomiting, unspecified vomiting type Acute   2. Generalized abdominal pain Acute   3. Hepatomegaly    4. Left renal stone    5. Diarrhea of presumed infectious origin Acute        Electronically signed by: Kristie Garcia M.D., 9/20/2024 7:31 PM

## 2024-09-21 NOTE — ED NOTES
Discharge instructions given and discussed. RX for zofran and dicyclomine given and patient educated to come back to ER for new or worsening symptoms. Instructed to follow up with PCP. Patient verbalized understanding. All IV's removed. GCS of 15. Pt walked out with steady gait.

## 2024-09-21 NOTE — ED TRIAGE NOTES
"Chief Complaint   Patient presents with    Abdominal Pain     Generalized described as \"cramping\"    Nausea/Vomiting/Diarrhea     Since this AM with chills. Denies known fever.     Hernia     Concerned for this in multiple areas of abd     Physical Exam  Pulmonary:      Effort: Pulmonary effort is normal.   Skin:     General: Skin is warm and dry.   Neurological:      Mental Status: She is alert.         "

## (undated) DEVICE — GLOVE BIOGEL SZ 7.5 SURGICAL PF LTX - (50PR/BX 4BX/CA)

## (undated) DEVICE — SODIUM CHL IRRIGATION 0.9% 1000ML (12EA/CA)

## (undated) DEVICE — BAG URODRAIN WITH TUBING - (20/CA)

## (undated) DEVICE — DRESSING POST OP BORDER 4 X 10 (5EA/BX)

## (undated) DEVICE — CON SEDATION/>5 YR 1ST 15 MIN

## (undated) DEVICE — JELLY, KY 2 0Z STERILE

## (undated) DEVICE — SENSOR SPO2 NEO LNCS ADHESIVE (20/BX) SEE USER NOTES

## (undated) DEVICE — TUBING CLEARLINK DUO-VENT - C-FLO (48EA/CA)

## (undated) DEVICE — HEAD HOLDER JUNIOR/ADULT

## (undated) DEVICE — NEPTUNE 4 PORT MANIFOLD - (20/PK)

## (undated) DEVICE — TRAY BLADDER CARE W/ 16 FR FOLEY CATHETER STATLOCK  (10/CA)

## (undated) DEVICE — PACK C-SECTION (2EA/CA)

## (undated) DEVICE — WATER IRRIG. STER 3000 ML - (4/CA)

## (undated) DEVICE — CATHETER IV NON-SAFETY 18 GA X 1 1/4 (50/BX 4BX/CA)

## (undated) DEVICE — KIT  I.V. START (100EA/CA)

## (undated) DEVICE — TRAY SPINAL ANESTHESIA NON-SAFETY (10/CA)

## (undated) DEVICE — SUTURE 1 CHROMIC CTX ETHICON - (36PK/BX)

## (undated) DEVICE — SET LEADWIRE 5 LEAD BEDSIDE DISPOSABLE ECG (1SET OF 5/EA)

## (undated) DEVICE — TAPE CLOTH MEDIPORE 6 INCH - (12RL/CA)

## (undated) DEVICE — WATER IRRIG. STER. 1500 ML - (9/CA)

## (undated) DEVICE — SLEEVE, SEQUENTIAL CALF REG

## (undated) DEVICE — SUTURE 2-0 CHROMIC GUT CT-1 27 (36PK/BX)"

## (undated) DEVICE — SET EXTENSION WITH 2 PORTS (48EA/CA) ***PART #2C8610 IS A SUBSTITUTE*****

## (undated) DEVICE — GOWN SURGEONS X-LARGE - DISP. (30/CA)

## (undated) DEVICE — SET IRRIGATION CYSTOSCOPY Y-TYPE L81 IN (20EA/CA)

## (undated) DEVICE — CANISTER SUCTION 3000ML MECHANICAL FILTER AUTO SHUTOFF MEDI-VAC NONSTERILE LF DISP  (40EA/CA)

## (undated) DEVICE — BLANKET UNDERBODY FULL ACCES - (5/CA)

## (undated) DEVICE — GOWN SURGICAL X-LARGE ULTRA - FILM-REINFORCED (20/CA)

## (undated) DEVICE — ZIPWIRE .038 STRAIGHT BENTSON TIP (5EA/BX)

## (undated) DEVICE — KIT ROOM DECONTAMINATION

## (undated) DEVICE — SPONGE GAUZESTER 4 X 4 4PLY - (128PK/CA)

## (undated) DEVICE — ELECTRODE DUAL RETURN W/ CORD - (50/PK)

## (undated) DEVICE — STAPLER SKIN DISP - (6/BX 10BX/CA) VISISTAT

## (undated) DEVICE — ELECTRODE 850 FOAM ADHESIVE - HYDROGEL RADIOTRNSPRNT (50/PK)

## (undated) DEVICE — DETERGENT RENUZYME PLUS 10 OZ PACKET (50/BX)

## (undated) DEVICE — SUTURE 1 VICRYL PLUS CTX - 36 INCH (36/BX)

## (undated) DEVICE — CHLORAPREP 26 ML APPLICATOR - ORANGE TINT(25/CA)

## (undated) DEVICE — RETRACTOR O C SECTION LRY - (5/BX)

## (undated) DEVICE — SUTURE 3-0 MONOCRYL PLUS PS-1 - 27 INCH (36/BX)

## (undated) DEVICE — CON SEDATION EA ADDL 15 MIN

## (undated) DEVICE — SUCTION INSTRUMENT YANKAUER BULBOUS TIP W/O VENT (50EA/CA)

## (undated) DEVICE — COVER FOOT UNIVERSAL DISP. - (25EA/CA)

## (undated) DEVICE — SUTURE 3-0 VICRYL PLUS CT-1 - 36 INCH (36/BX)

## (undated) DEVICE — BAG, SPONGE COUNT 50600

## (undated) DEVICE — SHEATH RO 4F 10CM (10EA/BX)

## (undated) DEVICE — TUBE CONNECT SUCTION CLEAR 120 X 1/4" (50EA/CA)"

## (undated) DEVICE — PROTECTOR ULNA NERVE - (36PR/CA)

## (undated) DEVICE — TUBE SUCTION YANKAUER  1/4 X 6FT (20EA/CA)"

## (undated) DEVICE — PACK CYSTOSCOPY - (14/CA)

## (undated) DEVICE — SPINAL